# Patient Record
Sex: MALE | Race: WHITE | NOT HISPANIC OR LATINO | Employment: FULL TIME | URBAN - METROPOLITAN AREA
[De-identification: names, ages, dates, MRNs, and addresses within clinical notes are randomized per-mention and may not be internally consistent; named-entity substitution may affect disease eponyms.]

---

## 2017-03-02 ENCOUNTER — OFFICE VISIT (OUTPATIENT)
Dept: FAMILY MEDICINE | Facility: CLINIC | Age: 47
End: 2017-03-02
Payer: COMMERCIAL

## 2017-03-02 VITALS
SYSTOLIC BLOOD PRESSURE: 127 MMHG | TEMPERATURE: 97.3 F | DIASTOLIC BLOOD PRESSURE: 87 MMHG | BODY MASS INDEX: 30.03 KG/M2 | HEART RATE: 97 BPM | HEIGHT: 74 IN | WEIGHT: 234 LBS

## 2017-03-02 DIAGNOSIS — F43.22 ADJUSTMENT DISORDER WITH ANXIOUS MOOD: Primary | ICD-10-CM

## 2017-03-02 DIAGNOSIS — G47.00 INSOMNIA, UNSPECIFIED TYPE: ICD-10-CM

## 2017-03-02 PROCEDURE — 99214 OFFICE O/P EST MOD 30 MIN: CPT | Performed by: FAMILY MEDICINE

## 2017-03-02 RX ORDER — TRAZODONE HYDROCHLORIDE 50 MG/1
50 TABLET, FILM COATED ORAL
Qty: 30 TABLET | Refills: 1 | Status: SHIPPED | OUTPATIENT
Start: 2017-03-02 | End: 2018-09-26

## 2017-03-02 RX ORDER — BUPROPION HYDROCHLORIDE 150 MG/1
TABLET, EXTENDED RELEASE ORAL
Qty: 30 TABLET | Refills: 2 | Status: SHIPPED | OUTPATIENT
Start: 2017-03-02 | End: 2018-09-26

## 2017-03-02 ASSESSMENT — ANXIETY QUESTIONNAIRES
2. NOT BEING ABLE TO STOP OR CONTROL WORRYING: NEARLY EVERY DAY
7. FEELING AFRAID AS IF SOMETHING AWFUL MIGHT HAPPEN: NOT AT ALL
GAD7 TOTAL SCORE: 18
5. BEING SO RESTLESS THAT IT IS HARD TO SIT STILL: NEARLY EVERY DAY
3. WORRYING TOO MUCH ABOUT DIFFERENT THINGS: NEARLY EVERY DAY
6. BECOMING EASILY ANNOYED OR IRRITABLE: NEARLY EVERY DAY
1. FEELING NERVOUS, ANXIOUS, OR ON EDGE: NEARLY EVERY DAY

## 2017-03-02 ASSESSMENT — PATIENT HEALTH QUESTIONNAIRE - PHQ9: 5. POOR APPETITE OR OVEREATING: NEARLY EVERY DAY

## 2017-03-02 NOTE — PROGRESS NOTES
SUBJECTIVE:                                                    Karan Josue is a 46 year old male who presents to clinic today for the following health issues:      Abnormal Mood Symptoms/anxiety     Onset: 1 mo ago     Description:   Depression: no   Anxiety: YES    Accompanying Signs & Symptoms:  Still participating in activities that you used to enjoy: YES  Fatigue: YES  Irritability: YES  Difficulty concentrating: YES  Changes in appetite: no   Problems with sleep: YES  Heart racing/beating fast : no   Thoughts of hurting yourself or others: none     History:   Recent stress: YES- job and trying to find a different job   Prior depression hospitalization: None  Family history of depression: no   Family history of anxiety: no      Precipitating factors:   Alcohol/drug use: no     Alleviating factors:  None        Therapies Tried and outcome: None    Insomnia     Onset: 3 mos     Description:   Time to fall asleep (sleep latency):less than  15 minutes  Middle of night awakening:  YES  Early morning awakening:  YES    Progression of Symptoms:  Worsening in the last mo    Accompanying Signs & Symptoms:  Daytime sleepiness/napping: no   Excessive snoring/apnea: no   Restless legs: no   Frequent urination: no   Chronic pain:  no    History:  Prior Insomnia: YES- 8-9 yrs ago     Precipitating factors:   New stressful situation: YES- loss of a job   Caffeine intake: YES- 6 can daily  OTC decongestants: no   Any new medications: no     Alleviating factors:  Self medicating (alcohol, etc.):  no       Therapies Tried and outcome: none       46 yr old male here for concerns of anxiety. He reports that he was recently laid off and has had more anxiety because of this, patient says that he has struggled with anxiety for a long time but his symptoms have become more pronounced in the last few weeks. He does not know any specific trigger but he tends to have several panic attacks a day. His symptoms usually involve chest  tightness , heart racing , clammy palms.   Patient also reports that sleep has been more difficult. He is able to initiate sleep but he wakes up several times in the middle of the night and is unable to fall back to sleep. He does not report any snoring, he has not had any sleep study. He has tried melatonin which caused him to be irritated on waking up. He also tried Unisom which made him really groggy in the morning. No other medical conditions of note.     Problem list and histories reviewed & adjusted, as indicated.  Additional history: as documented    Patient Active Problem List   Diagnosis     CARDIOVASCULAR SCREENING; LDL GOAL LESS THAN 160     Family history of prostate cancer     Past Surgical History   Procedure Laterality Date     No         Social History   Substance Use Topics     Smoking status: Never Smoker     Smokeless tobacco: Never Used     Alcohol use Yes      Comment: occasional     Family History   Problem Relation Age of Onset     C.A.D. Father 58     nonfatal MI x 2     Prostate Cancer Father 59     Prostate Cancer Maternal Grandfather 69     Prostate Cancer Maternal Uncle 48         Current Outpatient Prescriptions   Medication Sig Dispense Refill     buPROPion (WELLBUTRIN SR) 150 MG 12 hr tablet Take 1 tablet once daily and increase to 1 tablet twice daily after 4 to 7 days 30 tablet 2     traZODone (DESYREL) 50 MG tablet Take 1 tablet (50 mg) by mouth nightly as needed for sleep 30 tablet 1     No Known Allergies  BP Readings from Last 3 Encounters:   03/02/17 127/87   03/06/12 134/74   10/06/10 120/78    Wt Readings from Last 3 Encounters:   03/02/17 234 lb (106.1 kg)   03/06/12 247 lb (112 kg)   10/06/10 245 lb (111.1 kg)                  Labs reviewed in EPIC    Reviewed and updated as needed this visit by clinical staff  Tobacco  Allergies  Med Hx  Surg Hx  Fam Hx  Soc Hx      Reviewed and updated as needed this visit by Provider         ROS:  Constitutional, HEENT,  "cardiovascular, pulmonary, gi and gu systems are negative, except as otherwise noted.    OBJECTIVE:                                                    /87  Pulse 97  Temp 97.3  F (36.3  C) (Tympanic)  Ht 6' 2\" (1.88 m)  Wt 234 lb (106.1 kg)  BMI 30.04 kg/m2  Body mass index is 30.04 kg/(m^2).  GENERAL: healthy, alert and no distress  NECK: no adenopathy, no asymmetry, masses, or scars and thyroid normal to palpation  RESP: lungs clear to auscultation - no rales, rhonchi or wheezes  CV: regular rate and rhythm, normal S1 S2, no S3 or S4, no murmur, click or rub, no peripheral edema and peripheral pulses strong  ABDOMEN: soft, nontender, no hepatosplenomegaly, no masses and bowel sounds normal  MS: no gross musculoskeletal defects noted, no edema    Diagnostic Test Results:  none      ASSESSMENT/PLAN:                                                          ICD-10-CM    1. Adjustment disorder with anxious mood F43.22 buPROPion (WELLBUTRIN SR) 150 MG 12 hr tablet   2. Insomnia, unspecified type G47.00 traZODone (DESYREL) 50 MG tablet   Also recommended counseling and information was given in that regard.     FUTURE APPOINTMENTS:       - Follow-up visit as needed    Ping Taylor MD  [facility]  "

## 2017-03-02 NOTE — PATIENT INSTRUCTIONS
Thank you for choosing [facility].  You may be receiving a survey in the mail from [employer] regarding your visit today.  Please take a few minutes to complete and return the survey to let us know how we are doing.      If you have questions or concerns, please contact us via [employer] or you can contact your care team at [phone].    Our Clinic hours are:  Monday 6:40 am  to 7:00 pm  Tuesday -Friday 6:40 am to 5:00 pm    The [place] outpatient lab hours are:  Monday - Friday 6:10 am to 4:45 pm  Saturdays 7:00 am to 11:00 am  Appointments are required, call [phone]    If you have clinical questions after hours or would like to schedule an appointment,  call the clinic at [phone].  Treating Anxiety Disorders with Therapy  If you have an anxiety disorder, you don t have to suffer anymore. Treatment is available. Therapy (also called counseling) is often a helpful treatment for anxiety disorders. With therapy, a specially trained professional (therapist) helps you face and learn to manage your anxiety. Therapy can be short-term or long-term depending on your needs. In some cases, medication may also be prescribed with therapy. It may take time before you notice how much therapy is helping, but stick with it. With therapy, you can feel better.    Cognitive behavioral therapy (CBT)  Cognitive behavioral therapy (CBT) teaches you to manage anxiety. It does this by helping you understand how you think and act when you re anxious. Research has shown CBT to be a very effective treatment for anxiety disorders. How CBT is run is almost like a class. It involves homework and activities to build skills that teach you to cope with anxiety step by step. It can be done in a group or one-on-one, and often takes place for a set number of sessions. CBT has two main parts:    Cognitive therapy helps you identify the negative, irrational thoughts that occur with your anxiety. You ll learn to replace these with  more positive, realistic thoughts.    Behavioral therapy helps you change how you react to anxiety. You ll learn coping skills and methods for relaxing to help you better deal with anxiety.  Other forms of therapy  Other therapy methods may work better for you than CBT. Or, you may move from CBT to another form of therapy as your treatment needs change. This may mean meeting with a therapist by yourself or in a group. Therapy can also help you work through problems in your life, such as drug or alcohol dependence, that may be making your anxiety worse.  Getting better takes time  Therapy will help you feel better and teach you skills to help manage anxiety long term. But change doesn t happen right away. It takes a commitment from you. And treatment only works if you learn to face the causes of your anxiety. So, you might feel worse before you feel better. This can sometimes make it hard to stick with it. But remember: Therapy is a very effective treatment. The results will be well worth it.  Helping yourself  If anxiety is wearing you down, here are some things you can do to cope:    Don t fight your feelings. Anxiety feeds itself. The more you worry about it, the worse it gets. Instead, try to identify what might have triggered your anxiety. Then try to put this threat in perspective.    Keep in mind that you can t control everything about a situation. Change what you can and let the rest take its course.    Exercise   it s a great way to relieve tension and help your body feel relaxed.    Examine your life for stress, and try to find ways to reduce it.    Avoid caffeine and nicotine, which can make anxiety symptoms worse.    Fight the temptation to turn to alcohol or unprescribed drugs for relief. They only make things worse in the long run.     3617-1971 [employer]. [address] [place] [address], [place] [address]. All rights reserved. This information is not intended as a substitute for professional  medical care. Always follow your healthcare professional's instructions.

## 2017-03-02 NOTE — NURSING NOTE
"Chief Complaint   Patient presents with     Anxiety     Insomnia       Initial /87  Pulse 97  Temp 97.3  F (36.3  C) (Tympanic)  Ht 6' 2\" (1.88 m)  Wt 234 lb (106.1 kg)  BMI 30.04 kg/m2 Estimated body mass index is 30.04 kg/(m^2) as calculated from the following:    Height as of this encounter: 6' 2\" (1.88 m).    Weight as of this encounter: 234 lb (106.1 kg).  Medication Reconciliation: complete  "

## 2017-03-02 NOTE — MR AVS SNAPSHOT
After Visit Summary   3/2/2017    Karan Josue    MRN: [id number]           Patient Information     Date Of Birth          1970        Visit Information        Provider Department      3/2/2017 8:40 AM Ping Taylor MD [facility]        Today's Diagnoses     Adjustment disorder with anxious mood    -  1    Insomnia, unspecified type          Care Instructions          Thank you for choosing [facility].  You may be receiving a survey in the mail from [facility] your [facility] today.  Please take a few minutes to complete and return the survey to let us know how we are doing.      If you have questions or concerns, please contact us via [employer] or you can contact your care team at [phone].    Our Clinic hours are:  Monday 6:40 am  to 7:00 pm  Tuesday -Friday 6:40 am to 5:00 pm    The [place] outpatient lab hours are:  Monday - Friday 6:10 am to 4:45 pm  Saturdays 7:00 am to 11:00 am  Appointments are required, call [phone]    If you have clinical questions after hours or would like to schedule an appointment,  call the clinic at [phone].  Treating Anxiety Disorders with Therapy  If you have an anxiety disorder, you don t have to suffer anymore. Treatment is available. Therapy (also called counseling) is often a helpful treatment for anxiety disorders. With therapy, a specially trained professional (therapist) helps you face and learn to manage your anxiety. Therapy can be short-term or long-term depending on your needs. In some cases, medication may also be prescribed with therapy. It may take time before you notice how much therapy is helping, but stick with it. With therapy, you can feel better.    Cognitive behavioral therapy (CBT)  Cognitive behavioral therapy (CBT) teaches you to manage anxiety. It does this by helping you understand how you think and act when you re anxious. Research has shown CBT to be a very effective treatment for  anxiety disorders. How CBT is run is almost like a class. It involves homework and activities to build skills that teach you to cope with anxiety step by step. It can be done in a group or one-on-one, and often takes place for a set number of sessions. CBT has two main parts:    Cognitive therapy helps you identify the negative, irrational thoughts that occur with your anxiety. You ll learn to replace these with more positive, realistic thoughts.    Behavioral therapy helps you change how you react to anxiety. You ll learn coping skills and methods for relaxing to help you better deal with anxiety.  Other forms of therapy  Other therapy methods may work better for you than CBT. Or, you may move from CBT to another form of therapy as your treatment needs change. This may mean meeting with a therapist by yourself or in a group. Therapy can also help you work through problems in your life, such as drug or alcohol dependence, that may be making your anxiety worse.  Getting better takes time  Therapy will help you feel better and teach you skills to help manage anxiety long term. But change doesn t happen right away. It takes a commitment from you. And treatment only works if you learn to face the causes of your anxiety. So, you might feel worse before you feel better. This can sometimes make it hard to stick with it. But remember: Therapy is a very effective treatment. The results will be well worth it.  Helping yourself  If anxiety is wearing you down, here are some things you can do to cope:    Don t fight your feelings. Anxiety feeds itself. The more you worry about it, the worse it gets. Instead, try to identify what might have triggered your anxiety. Then try to put this threat in perspective.    Keep in mind that you can t control everything about a situation. Change what you can and let the rest take its course.    Exercise -- it s a great way to relieve tension and help your body feel relaxed.    Examine your  "life for stress, and try to find ways to reduce it.    Avoid caffeine and nicotine, which can make anxiety symptoms worse.    Fight the temptation to turn to alcohol or unprescribed drugs for relief. They only make things worse in the long run.     5596-7007 The "[employer]. [address] [place] [address]. All rights reserved. This information is not intended as a substitute for professional medical care. Always follow your healthcare professional's instructions.              Follow-ups after your visit        Who to contact     If you have questions or need follow up information about today's clinic visit or your schedule please contact [facility] directly at [phone].  Normal or non-critical lab and imaging results will be communicated to you by [employer], letter or phone within 4 business days after the clinic has received the results. If you do not hear from us within 7 days, please contact the clinic through [employer] or phone. If you have a critical or abnormal lab result, we will notify you by phone as soon as possible.  Submit refill requests through [employer] or call your pharmacy and they will forward the refill request to us. Please allow 3 business days for your refill to be completed.          Additional Information About Your Visit        [employer] Information     [employer] lets you send messages to your doctor, view your test results, renew your prescriptions, schedule appointments and more. To sign up, go to [email or web address] . Click on \"Log in\" on the left side of the screen, which will take you to the Welcome page. Then click on \"Sign up Now\" on the right side of the page.     You will be asked to enter the access code listed below, as well as some personal information. Please follow the directions to create your username and password.     Your access code is: [email or web address]  Expires: 2017  9:13 AM     Your access code will  in 90 days. If you need help " "or a new code, please call your [facility] or [phone].        Care EveryWhere ID     This is your Care EveryWhere ID. This could be used by other organizations to access your [place] medical records  [id number]        Your Vitals Were     Pulse Temperature Height BMI (Body Mass Index)          97 97.3  F (36.3  C) (Tympanic) 6' 2\" (1.88 m) 30.04 kg/m2         Blood Pressure from Last 3 Encounters:   03/02/17 127/87   03/06/12 134/74   10/06/10 120/78    Weight from Last 3 Encounters:   03/02/17 234 lb (106.1 kg)   03/06/12 247 lb (112 kg)   10/06/10 245 lb (111.1 kg)              Today, you had the following     No orders found for display         Today's Medication Changes          These changes are accurate as of: 3/2/17  9:13 AM.  If you have any questions, ask your nurse or doctor.               Start taking these medicines.        Dose/Directions    buPROPion 150 MG 12 hr tablet   Commonly known as:  WELLBUTRIN SR   Used for:  Adjustment disorder with anxious mood   Started by:  Ping Taylor MD        Take 1 tablet once daily and increase to 1 tablet twice daily after 4 to 7 days   Quantity:  30 tablet   Refills:  2       traZODone 50 MG tablet   Commonly known as:  DESYREL   Used for:  Insomnia, unspecified type   Started by:  Ping Taylor MD        Dose:  50 mg   Take 1 tablet (50 mg) by mouth nightly as needed for sleep   Quantity:  30 tablet   Refills:  1            Where to get your medicines      These medications were sent to [address] [address]     Phone:  [phone]     buPROPion 150 MG 12 hr tablet    traZODone 50 MG tablet                Primary Care Provider    None Specified       No primary provider on file.        Thank you!     Thank you for choosing [facility]  for your care. Our goal is always to provide you with excellent care. Hearing back from our patients is one way we " can continue to improve our services. Please take a few minutes to complete the written survey that you may receive in the mail after your visit with us. Thank you!             Your Updated Medication List - Protect others around you: Learn how to safely use, store and throw away your medicines at www.disposemymeds.org.          This list is accurate as of: 3/2/17  9:13 AM.  Always use your most recent med list.                   Brand Name Dispense Instructions for use    buPROPion 150 MG 12 hr tablet    WELLBUTRIN SR    30 tablet    Take 1 tablet once daily and increase to 1 tablet twice daily after 4 to 7 days       traZODone 50 MG tablet    DESYREL    30 tablet    Take 1 tablet (50 mg) by mouth nightly as needed for sleep

## 2017-03-03 ASSESSMENT — PATIENT HEALTH QUESTIONNAIRE - PHQ9: SUM OF ALL RESPONSES TO PHQ QUESTIONS 1-9: 17

## 2017-03-03 ASSESSMENT — ANXIETY QUESTIONNAIRES: GAD7 TOTAL SCORE: 18

## 2018-09-26 ENCOUNTER — RADIANT APPOINTMENT (OUTPATIENT)
Dept: GENERAL RADIOLOGY | Facility: CLINIC | Age: 48
End: 2018-09-26
Attending: PHYSICIAN ASSISTANT
Payer: COMMERCIAL

## 2018-09-26 ENCOUNTER — OFFICE VISIT (OUTPATIENT)
Dept: FAMILY MEDICINE | Facility: CLINIC | Age: 48
End: 2018-09-26
Payer: COMMERCIAL

## 2018-09-26 VITALS
DIASTOLIC BLOOD PRESSURE: 90 MMHG | HEART RATE: 53 BPM | WEIGHT: 262.7 LBS | HEIGHT: 72 IN | BODY MASS INDEX: 35.58 KG/M2 | SYSTOLIC BLOOD PRESSURE: 150 MMHG

## 2018-09-26 DIAGNOSIS — I10 BENIGN ESSENTIAL HYPERTENSION: ICD-10-CM

## 2018-09-26 DIAGNOSIS — Z12.11 SPECIAL SCREENING FOR MALIGNANT NEOPLASMS, COLON: ICD-10-CM

## 2018-09-26 DIAGNOSIS — E11.65 TYPE 2 DIABETES MELLITUS WITH HYPERGLYCEMIA, WITHOUT LONG-TERM CURRENT USE OF INSULIN (H): Primary | ICD-10-CM

## 2018-09-26 DIAGNOSIS — E66.01 MORBID OBESITY (H): ICD-10-CM

## 2018-09-26 DIAGNOSIS — R06.09 DOE (DYSPNEA ON EXERTION): ICD-10-CM

## 2018-09-26 DIAGNOSIS — Z11.4 SCREENING FOR HUMAN IMMUNODEFICIENCY VIRUS: ICD-10-CM

## 2018-09-26 DIAGNOSIS — Z83.3 FAMILY HISTORY OF DIABETES MELLITUS: ICD-10-CM

## 2018-09-26 DIAGNOSIS — R07.89 CHEST TIGHTNESS: ICD-10-CM

## 2018-09-26 DIAGNOSIS — Z13.220 LIPID SCREENING: ICD-10-CM

## 2018-09-26 DIAGNOSIS — Z80.42 FAMILY HISTORY OF PROSTATE CANCER: ICD-10-CM

## 2018-09-26 DIAGNOSIS — F31.9 BIPOLAR I DISORDER (H): ICD-10-CM

## 2018-09-26 PROBLEM — E11.9 DIABETES MELLITUS, TYPE 2 (H): Status: ACTIVE | Noted: 2018-09-26

## 2018-09-26 LAB
ALBUMIN SERPL-MCNC: 3.8 G/DL (ref 3.4–5)
ALP SERPL-CCNC: 140 U/L (ref 40–150)
ALT SERPL W P-5'-P-CCNC: 38 U/L (ref 0–70)
ANION GAP SERPL CALCULATED.3IONS-SCNC: 7 MMOL/L (ref 3–14)
AST SERPL W P-5'-P-CCNC: 15 U/L (ref 0–45)
BASOPHILS # BLD AUTO: 0.1 10E9/L (ref 0–0.2)
BASOPHILS NFR BLD AUTO: 1 %
BILIRUB SERPL-MCNC: 0.6 MG/DL (ref 0.2–1.3)
BUN SERPL-MCNC: 13 MG/DL (ref 7–30)
CALCIUM SERPL-MCNC: 9 MG/DL (ref 8.5–10.1)
CHLORIDE SERPL-SCNC: 101 MMOL/L (ref 94–109)
CHOLEST SERPL-MCNC: 213 MG/DL
CO2 SERPL-SCNC: 27 MMOL/L (ref 20–32)
CREAT SERPL-MCNC: 0.8 MG/DL (ref 0.66–1.25)
DIFFERENTIAL METHOD BLD: ABNORMAL
EOSINOPHIL # BLD AUTO: 0.2 10E9/L (ref 0–0.7)
EOSINOPHIL NFR BLD AUTO: 2.7 %
ERYTHROCYTE [DISTWIDTH] IN BLOOD BY AUTOMATED COUNT: 13.2 % (ref 10–15)
GFR SERPL CREATININE-BSD FRML MDRD: >90 ML/MIN/1.7M2
GLUCOSE SERPL-MCNC: 246 MG/DL (ref 70–99)
HBA1C MFR BLD: 10.8 % (ref 0–5.6)
HCT VFR BLD AUTO: 45.6 % (ref 40–53)
HDLC SERPL-MCNC: 34 MG/DL
HGB BLD-MCNC: 14.8 G/DL (ref 13.3–17.7)
HIV 1+2 AB+HIV1 P24 AG SERPL QL IA: NONREACTIVE
IMM GRANULOCYTES # BLD: 0.1 10E9/L (ref 0–0.4)
IMM GRANULOCYTES NFR BLD: 1.2 %
LDLC SERPL CALC-MCNC: 158 MG/DL
LYMPHOCYTES # BLD AUTO: 2.4 10E9/L (ref 0.8–5.3)
LYMPHOCYTES NFR BLD AUTO: 27.5 %
MCH RBC QN AUTO: 26.2 PG (ref 26.5–33)
MCHC RBC AUTO-ENTMCNC: 32.5 G/DL (ref 31.5–36.5)
MCV RBC AUTO: 81 FL (ref 78–100)
MONOCYTES # BLD AUTO: 0.8 10E9/L (ref 0–1.3)
MONOCYTES NFR BLD AUTO: 9 %
NEUTROPHILS # BLD AUTO: 5.2 10E9/L (ref 1.6–8.3)
NEUTROPHILS NFR BLD AUTO: 58.6 %
NONHDLC SERPL-MCNC: 179 MG/DL
NRBC # BLD AUTO: 0 10*3/UL
NRBC BLD AUTO-RTO: 0 /100
PLATELET # BLD AUTO: 376 10E9/L (ref 150–450)
POTASSIUM SERPL-SCNC: 4.1 MMOL/L (ref 3.4–5.3)
PROT SERPL-MCNC: 7.8 G/DL (ref 6.8–8.8)
PSA SERPL-ACNC: 1.92 UG/L (ref 0–4)
RBC # BLD AUTO: 5.64 10E12/L (ref 4.4–5.9)
SODIUM SERPL-SCNC: 135 MMOL/L (ref 133–144)
TRIGL SERPL-MCNC: 106 MG/DL
TSH SERPL DL<=0.005 MIU/L-ACNC: 1.43 MU/L (ref 0.4–4)
WBC # BLD AUTO: 8.9 10E9/L (ref 4–11)

## 2018-09-26 PROCEDURE — 80061 LIPID PANEL: CPT | Performed by: PHYSICIAN ASSISTANT

## 2018-09-26 PROCEDURE — G0103 PSA SCREENING: HCPCS | Performed by: PHYSICIAN ASSISTANT

## 2018-09-26 PROCEDURE — 71046 X-RAY EXAM CHEST 2 VIEWS: CPT | Mod: FY

## 2018-09-26 PROCEDURE — 84443 ASSAY THYROID STIM HORMONE: CPT | Performed by: PHYSICIAN ASSISTANT

## 2018-09-26 PROCEDURE — 36415 COLL VENOUS BLD VENIPUNCTURE: CPT | Performed by: PHYSICIAN ASSISTANT

## 2018-09-26 PROCEDURE — 83036 HEMOGLOBIN GLYCOSYLATED A1C: CPT | Performed by: PHYSICIAN ASSISTANT

## 2018-09-26 PROCEDURE — 87389 HIV-1 AG W/HIV-1&-2 AB AG IA: CPT | Performed by: PHYSICIAN ASSISTANT

## 2018-09-26 PROCEDURE — 80053 COMPREHEN METABOLIC PANEL: CPT | Performed by: PHYSICIAN ASSISTANT

## 2018-09-26 PROCEDURE — 93000 ELECTROCARDIOGRAM COMPLETE: CPT | Performed by: PHYSICIAN ASSISTANT

## 2018-09-26 PROCEDURE — 85025 COMPLETE CBC W/AUTO DIFF WBC: CPT | Performed by: PHYSICIAN ASSISTANT

## 2018-09-26 PROCEDURE — 99214 OFFICE O/P EST MOD 30 MIN: CPT | Performed by: PHYSICIAN ASSISTANT

## 2018-09-26 RX ORDER — METFORMIN HCL 500 MG
500 TABLET, EXTENDED RELEASE 24 HR ORAL
Qty: 120 TABLET | Refills: 1 | Status: SHIPPED | OUTPATIENT
Start: 2018-09-26 | End: 2018-12-24

## 2018-09-26 RX ORDER — LISINOPRIL 20 MG/1
20 TABLET ORAL DAILY
Qty: 30 TABLET | Refills: 1 | Status: SHIPPED | OUTPATIENT
Start: 2018-09-26 | End: 2018-12-21 | Stop reason: DRUGHIGH

## 2018-09-26 ASSESSMENT — PATIENT HEALTH QUESTIONNAIRE - PHQ9: 5. POOR APPETITE OR OVEREATING: NEARLY EVERY DAY

## 2018-09-26 ASSESSMENT — ANXIETY QUESTIONNAIRES
GAD7 TOTAL SCORE: 7
6. BECOMING EASILY ANNOYED OR IRRITABLE: SEVERAL DAYS
2. NOT BEING ABLE TO STOP OR CONTROL WORRYING: NOT AT ALL
3. WORRYING TOO MUCH ABOUT DIFFERENT THINGS: NOT AT ALL
5. BEING SO RESTLESS THAT IT IS HARD TO SIT STILL: NEARLY EVERY DAY
1. FEELING NERVOUS, ANXIOUS, OR ON EDGE: NOT AT ALL
7. FEELING AFRAID AS IF SOMETHING AWFUL MIGHT HAPPEN: NOT AT ALL

## 2018-09-26 NOTE — MR AVS SNAPSHOT
After Visit Summary   9/26/2018    Karan Josue    MRN: [id number]           Patient Information     Date Of Birth          1970        Visit Information        Provider Department      9/26/2018 8:20 AM Jimena Grider PA-C [facility] [place]        Today's Diagnoses     Family history of diabetes mellitus    -  1    Lipid screening        Special screening for malignant neoplasms, colon        Screening for human immunodeficiency virus        SALCEDO (dyspnea on exertion)        Type 2 diabetes mellitus with hyperglycemia, without long-term current use of insulin (H)        Family history of prostate cancer        Chest tightness        Morbid obesity (H)        Bipolar I disorder (H)        Benign essential hypertension          Care Instructions    Diabetes: start metformin. This may cause diarrhea. Let me know how you do with this  Make appointment to talk to diabetic educator  Start checking blood sugars 3 times daily, write them down    Hypertension: start lisinopril 20 mg to protect your kidneys and treat hypertension    Follow-up in 3-4 weeks for a recheck          Follow-ups after your visit        Additional Services     DIABETES EDUCATOR REFERRAL       DIABETES SELF MANAGEMENT TRAINING (DSMT)      Your provider has referred you to Diabetes Education: [facility]: Diabetes Education - All [facility] [phone]   [email or web address]     If an urgent visit is needed or A1C is above 12, Care Team to call the Diabetes  Education Team at [phone] or send an In Basket message to the Diabetes Education Pool (P DIAB ED-PATIENT CARE).    A  will call you to make your appointment. If it has been more than 3 business days since your referral was placed, please call the above phone number to schedule.    Type of training and number of hours: New Diagnosis: Initial group DSMT - 10 hours.      Diabetes Type: Type 2 - On Oral Medication    Medicare covers: 10 hours of initial DSMT in 12 month period from the time of first visit, plus 2 hours of follow-up DSMT annually, and additional hours as requested for insulin training.         Diabetes Co-Morbidities: hypertension               A1C Goal:  <7.0       A1C is: Lab Results       Component                Value               Date                       A1C                      10.8                09/26/2018              MEDICAL NUTRITION THERAPY (MNT) for Diabetes    Medical Nutrition Therapy with a Registered Dietitian can be provided in coordination with Diabetes Self-Management Training to assist in achieving optimal diabetes management.    MNT Type and Hours: New diagnosis: Initial MNT - 3 hours                       Medicare will cover: 3 hours initial MNT in 12 month period after first visit, plus 2 hours of follow-up MNT annually        Diabetes Education Topics: Comprehensive Knowledge Assessment and Instruction    Special Educational Needs Requiring Individual DSMT: None      Please be aware that coverage of these services is subject to the terms and limitations of your health insurance plan.  Call member services at your health plan to determine Diabetes Self-Management Training (Codes  and ) and Medical Nutrition Therapy (Codes 70491 and 90621) benefits and ask which blood glucose monitor brands are covered by your plan.  Please bring the following with you to your appointment:    (1)  List of current medications   (2)  List of Blood Glucose Monitor brands that are covered by your insurance plan  (3)  Blood Glucose Monitor and log book  (4)   Food records for the 3 days prior to your visit    The Certified Diabetes Educator may make diabetes medication adjustments per the CDE Protocol and Collaborative Practice Agreement.                  Follow-up notes from your care team     Return in about 3 weeks (around 10/17/2018).      Future tests that were ordered for you today     Open  "Future Orders        Priority Expected Expires Ordered    Exercise Stress Echocardiogram Routine  9/26/2019 9/26/2018            Who to contact     Normal or non-critical lab and imaging results will be communicated to you by MyChart, letter or phone within 4 business days after the clinic has received the results. If you do not hear from us within 7 days, please contact the clinic through MyChart or phone. If you have a critical or abnormal lab result, we will notify you by phone as soon as possible.  Submit refill requests through [employer] or call your pharmacy and they will forward the refill request to us. Please allow 3 business days for your refill to be completed.          If you need to speak with a  for additional information , please call: [phone]             Additional Information About Your Visit        Care EveryWhere ID     This is your Care EveryWhere ID. This could be used by other organizations to access your [facility] medical records  [id number]        Your Vitals Were     Pulse Height BMI (Body Mass Index)             53 6' 0.24\" (1.835 m) 35.39 kg/m2          Blood Pressure from Last 3 Encounters:   09/26/18 150/90   03/02/17 127/87   03/06/12 134/74    Weight from Last 3 Encounters:   09/26/18 262 lb 11.2 oz (119.2 kg)   03/02/17 234 lb (106.1 kg)   03/06/12 247 lb (112 kg)              We Performed the Following     CBC with platelets differential     Comprehensive metabolic panel     DEPRESSION ACTION PLAN (DAP)     DIABETES EDUCATOR REFERRAL     EKG 12-lead complete w/read - Clinics     Hemoglobin A1c     HIV Antigen Antibody Combo     Lipid panel reflex to direct LDL Fasting     PSA, screen     TSH with free T4 reflex          Today's Medication Changes          These changes are accurate as of 9/26/18  9:25 AM.  If you have any questions, ask your nurse or doctor.               Start taking these medicines.        Dose/Directions    blood glucose lancets standard "   Commonly known as:  no brand specified   Used for:  Type 2 diabetes mellitus with hyperglycemia, without long-term current use of insulin (H)   Started by:  Jimena Grider PA-C        Use to test blood sugar 3 times daily or as directed.   Quantity:  100 each   Refills:  11       blood glucose monitoring meter device kit   Commonly known as:  no brand specified   Used for:  Type 2 diabetes mellitus with hyperglycemia, without long-term current use of insulin (H)   Started by:  Jimena Grider PA-C        Use to test blood sugar 3 times daily or as directed.   Quantity:  1 kit   Refills:  0       blood glucose monitoring test strip   Commonly known as:  no brand specified   Used for:  Type 2 diabetes mellitus with hyperglycemia, without long-term current use of insulin (H)   Started by:  Jimena Grider PA-C        Use to test blood sugars 3 times daily or as directed   Quantity:  100 strip   Refills:  1       lisinopril 20 MG tablet   Commonly known as:  PRINIVIL/ZESTRIL   Used for:  Benign essential hypertension   Started by:  Jimena Grider PA-C        Dose:  20 mg   Take 1 tablet (20 mg) by mouth daily   Quantity:  30 tablet   Refills:  1       metFORMIN 500 MG 24 hr tablet   Commonly known as:  GLUCOPHAGE-XR   Used for:  Type 2 diabetes mellitus with hyperglycemia, without long-term current use of insulin (H)   Started by:  Jimena Grider PA-C        Dose:  500 mg   Take 1 tablet (500 mg) by mouth daily (with dinner) For a few days. Then increase to 500 mg two times daily x1 week, then increase to 1000 mg (2 tablets) two times daily   Quantity:  120 tablet   Refills:  1            Where to get your medicines      These medications were sent to [facility] [place]  [place], [place] - [address]  [address] [place] [place] [address]     Phone:  [phone]     blood glucose lancets standard    blood glucose monitoring meter device kit    blood glucose monitoring test strip     lisinopril 20 MG tablet         Some of these will need a paper prescription and others can be bought over the counter.  Ask your nurse if you have questions.     Bring a paper prescription for each of these medications     metFORMIN 500 MG 24 hr tablet                Primary Care Provider Office Phone # Fax #    [facility] [phone] [address] [facility] [address]        Equal Access to Services     SHERICE ANDERSON : Hadii aad ku hadasho Soomaali, waaxda luqadaha, qaybta kaalmada adeegyada, waxay idiin hayaan adeeg khdaniash la'aan . So [facility] [phone].    ATENCIÓN: Si habla español, tiene a cortez disposición servicios gratuitos de asistencia lingüística. Llame al [phone].    We comply with applicable federal civil rights laws and [place] laws. We do not discriminate on the basis of race, color, national origin, age, disability, sex, sexual orientation, or gender identity.            Thank you!     Thank you for choosing [facility]  for your care. Our goal is always to provide you with excellent care. Hearing back from our patients is one way we can continue to improve our services. Please take a few minutes to complete the written survey that you may receive in the mail after your visit with us. Thank you!             Your Updated Medication List - Protect others around you: Learn how to safely use, store and throw away your medicines at www.disposemymeds.org.          This list is accurate as of 9/26/18  9:25 AM.  Always use your most recent med list.                   Brand Name Dispense Instructions for use Diagnosis    blood glucose lancets standard    no brand specified    100 each    Use to test blood sugar 3 times daily or as directed.    Type 2 diabetes mellitus with hyperglycemia, without long-term current use of insulin (H)       blood glucose monitoring meter device kit    no brand specified    1 kit    Use to test blood sugar 3 times daily or as  directed.    Type 2 diabetes mellitus with hyperglycemia, without long-term current use of insulin (H)       blood glucose monitoring test strip    no brand specified    100 strip    Use to test blood sugars 3 times daily or as directed    Type 2 diabetes mellitus with hyperglycemia, without long-term current use of insulin (H)       lisinopril 20 MG tablet    PRINIVIL/ZESTRIL    30 tablet    Take 1 tablet (20 mg) by mouth daily    Benign essential hypertension       metFORMIN 500 MG 24 hr tablet    GLUCOPHAGE-XR    120 tablet    Take 1 tablet (500 mg) by mouth daily (with dinner) For a few days. Then increase to 500 mg two times daily x1 week, then increase to 1000 mg (2 tablets) two times daily    Type 2 diabetes mellitus with hyperglycemia, without long-term current use of insulin (H)

## 2018-09-26 NOTE — LETTER
My Depression Action Plan  Name: Karan Josue   Date of Birth 1970  Date: 9/26/2018    My doctor: Anya Grimaldo   My clinic: [facility]  [address] [facility] [address] [phone]          GREEN    ZONE   Good Control    What it looks like:     Things are going generally well. You have normal up s and down s. You may even feel depressed from time to time, but bad moods usually last less than a day.   What you need to do:  1. Continue to care for yourself (see self care plan)  2. Check your depression survival kit and update it as needed  3. Follow your physician s recommendations including any medication.  4. Do not stop taking medication unless you consult with your physician first.           YELLOW         ZONE Getting Worse    What it looks like:     Depression is starting to interfere with your life.     It may be hard to get out of bed; you may be starting to isolate yourself from others.    Symptoms of depression are starting to last most all day and this has happened for several days.     You may have suicidal thoughts but they are not constant.   What you need to do:     1. Call your care team, your response to treatment will improve if you keep your care team informed of your progress. Yellow periods are signs an adjustment may need to be made.     2. Continue your self-care, even if you have to fake it!    3. Talk to someone in your support network    4. Open up your depression survival kit           RED    ZONE Medical Alert - Get Help    What it looks like:     Depression is seriously interfering with your life.     You may experience these or other symptoms: You can t get out of bed most days, can t work or engage in other necessary activities, you have trouble taking care of basic hygiene, or basic responsibilities, thoughts of suicide or death that will not go away, self-injurious behavior.     What you need to do:  1. Call your care team and request  a same-day appointment. If they are not available (weekends or after hours) call your local crisis line, emergency room or 911.            Depression Self Care Plan / Survival Kit    Self-Care for Depression  Here s the deal. Your body and mind are really not as separate as most people think.  What you do and think affects how you feel and how you feel influences what you do and think. This means if you do things that people who feel good do, it will help you feel better.  Sometimes this is all it takes.  There is also a place for medication and therapy depending on how severe your depression is, so be sure to consult with your medical provider and/ or Behavioral Health Consultant if your symptoms are worsening or not improving.     In order to better manage my stress, I will:    Exercise  Get some form of exercise, every day. This will help reduce pain and release endorphins, the  feel good  chemicals in your brain. This is almost as good as taking antidepressants!  This is not the same as joining a gym and then never going! (they count on that by the way ) It can be as simple as just going for a walk or doing some gardening, anything that will get you moving.      Hygiene   Maintain good hygiene (Get out of bed in the morning, Make your bed, Brush your teeth, Take a shower, and Get dressed like you were going to work, even if you are unemployed).  If your clothes don't fit try to get ones that do.    Diet  I will strive to eat foods that are good for me, drink plenty of water, and avoid excessive sugar, caffeine, alcohol, and other mood-altering substances.  Some foods that are helpful in depression are: complex carbohydrates, B vitamins, flaxseed, fish or fish oil, fresh fruits and vegetables.    Psychotherapy  I agree to participate in Individual Therapy (if recommended).    Medication  If prescribed medications, I agree to take them.  Missing doses can result in serious side effects.  I understand that drinking  alcohol, or other illicit drug use, may cause potential side effects.  I will not stop my medication abruptly without first discussing it with my provider.    Staying Connected With Others  I will stay in touch with my friends, family members, and my primary care provider/team.    Use your imagination  Be creative.  We all have a creative side; it doesn t matter if it s oil painting, sand castles, or mud pies! This will also kick up the endorphins.    Witness Beauty  (AKA stop and smell the roses) Take a look outside, even in mid-winter. Notice colors, textures. Watch the squirrels and birds.     Service to others  Be of service to others.  There is always someone else in need.  By helping others we can  get out of ourselves  and remember the really important things.  This also provides opportunities for practicing all the other parts of the program.    Humor  Laugh and be silly!  Adjust your TV habits for less news and crime-drama and more comedy.    Control your stress  Try breathing deep, massage therapy, biofeedback, and meditation. Find time to relax each day.     My support system    Clinic Contact:  Phone number:    Contact 1:  Phone number:    Contact 2:  Phone number:    Catholic/:  Phone number:    Therapist:  Phone number:    Local crisis center:    Phone number:    Other community support:  Phone number:

## 2018-09-26 NOTE — PATIENT INSTRUCTIONS
Diabetes: start metformin. This may cause diarrhea. Let me know how you do with this  Make appointment to talk to diabetic educator  Start checking blood sugars 3 times daily, write them down    Hypertension: start lisinopril 20 mg to protect your kidneys and treat hypertension    Follow-up in 3-4 weeks for a recheck   Subjective:       Patient ID: Jaron Stein is a 41 y.o. male.    Vitals:  height is 6' (1.829 m) and weight is 115.7 kg (255 lb). His temperature is 98.4 °F (36.9 °C). His blood pressure is 105/65 and his pulse is 78. His respiration is 18 and oxygen saturation is 98%.     Chief Complaint: URI    URI    This is a new problem. Episode onset: 2 weeks. The problem has been gradually worsening. There has been no fever. Associated symptoms include congestion, coughing, headaches, a plugged ear sensation, rhinorrhea, sinus pain and sneezing. Pertinent negatives include no abdominal pain, chest pain, ear pain, nausea, sore throat or wheezing. He has tried acetaminophen and decongestant (nyquil) for the symptoms. The treatment provided mild relief.     Review of Systems   Constitution: Negative for chills, fever and malaise/fatigue.   HENT: Positive for congestion, hoarse voice, rhinorrhea, sinus pain and sneezing. Negative for ear pain and sore throat.    Eyes: Positive for discharge and redness.   Cardiovascular: Negative for chest pain, dyspnea on exertion and leg swelling.   Respiratory: Positive for cough and sputum production. Negative for shortness of breath and wheezing.    Musculoskeletal: Negative for myalgias.   Gastrointestinal: Negative for abdominal pain and nausea.   Neurological: Positive for headaches.   All other systems reviewed and are negative.      Objective:      Physical Exam   Constitutional: He is oriented to person, place, and time. Vital signs are normal. He appears well-developed and well-nourished. He is cooperative.  Non-toxic appearance. He does not have a sickly appearance. He does not appear ill. No distress.   HENT:   Head: Normocephalic and atraumatic.   Right Ear: Hearing, external ear and ear canal normal. A middle ear effusion is present.   Left Ear: Hearing, external ear and ear canal normal. A middle ear effusion is present.   Nose: Mucosal edema and rhinorrhea present. Right sinus  exhibits maxillary sinus tenderness and frontal sinus tenderness. Left sinus exhibits maxillary sinus tenderness and frontal sinus tenderness.   Mouth/Throat: Uvula is midline and mucous membranes are normal. Posterior oropharyngeal edema and posterior oropharyngeal erythema present.   Eyes: Conjunctivae and lids are normal.   Neck: Normal range of motion and full passive range of motion without pain. Neck supple. No neck rigidity. No edema, no erythema and normal range of motion present.   Cardiovascular: Normal rate, regular rhythm and normal heart sounds.    Pulmonary/Chest: Effort normal and breath sounds normal. No accessory muscle usage. No apnea, no tachypnea and no bradypnea. No respiratory distress. He has no decreased breath sounds. He has no wheezes. He has no rhonchi. He has no rales.   Positive cough   Abdominal:   Obese abdomen   Lymphadenopathy:        Head (right side): No submental, no submandibular, no tonsillar, no preauricular, no posterior auricular and no occipital adenopathy present.        Head (left side): No submental, no submandibular, no tonsillar, no preauricular, no posterior auricular and no occipital adenopathy present.     He has no cervical adenopathy.   Neurological: He is alert and oriented to person, place, and time.   Skin: Skin is warm. Capillary refill takes less than 2 seconds.   Psychiatric: He has a normal mood and affect. His speech is normal and behavior is normal.   Nursing note and vitals reviewed.      Assessment:       1. Acute non-recurrent pansinusitis    2. Bacterial conjunctivitis        Plan:         Acute non-recurrent pansinusitis  -     amoxicillin-clavulanate 875-125mg (AUGMENTIN) 875-125 mg per tablet; Take 1 tablet by mouth 2 (two) times daily.  Dispense: 20 tablet; Refill: 0  -     benzonatate (TESSALON PERLES) 100 MG capsule; Take 2 capsules (200 mg total) by mouth 3 (three) times daily as needed.  Dispense: 30 capsule; Refill: 0  -     fluticasone  (FLONASE) 50 mcg/actuation nasal spray; 2 sprays (100 mcg total) by Each Nare route once daily.  Dispense: 1 Bottle; Refill: 0    Bacterial conjunctivitis  -     ciprofloxacin HCl (CILOXAN) 0.3 % ophthalmic solution; Place 1 drop into both eyes every 4 (four) hours.  Dispense: 10 mL; Refill: 0      Mucinex and increase fluid intake.
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
## 2018-09-27 ASSESSMENT — ANXIETY QUESTIONNAIRES: GAD7 TOTAL SCORE: 7

## 2018-09-27 ASSESSMENT — PATIENT HEALTH QUESTIONNAIRE - PHQ9: SUM OF ALL RESPONSES TO PHQ QUESTIONS 1-9: 3

## 2018-10-04 ENCOUNTER — TELEPHONE (OUTPATIENT)
Dept: FAMILY MEDICINE | Facility: CLINIC | Age: 48
End: 2018-10-04

## 2018-10-04 ENCOUNTER — HOSPITAL ENCOUNTER (OUTPATIENT)
Dept: CARDIOLOGY | Facility: CLINIC | Age: 48
Discharge: HOME | End: 2018-10-04
Attending: PHYSICIAN ASSISTANT | Admitting: PHYSICIAN ASSISTANT
Payer: COMMERCIAL

## 2018-10-04 DIAGNOSIS — R94.39 ABNORMAL CARDIOVASCULAR STRESS TEST: Primary | ICD-10-CM

## 2018-10-04 DIAGNOSIS — R07.9 CHEST PAIN, UNSPECIFIED TYPE: ICD-10-CM

## 2018-10-04 DIAGNOSIS — R06.09 DOE (DYSPNEA ON EXERTION): ICD-10-CM

## 2018-10-04 DIAGNOSIS — R07.89 CHEST TIGHTNESS: ICD-10-CM

## 2018-10-04 PROCEDURE — 93018 CV STRESS TEST I&R ONLY: CPT | Performed by: INTERNAL MEDICINE

## 2018-10-04 PROCEDURE — 93321 DOPPLER ECHO F-UP/LMTD STD: CPT | Mod: 26 | Performed by: INTERNAL MEDICINE

## 2018-10-04 PROCEDURE — 93325 DOPPLER ECHO COLOR FLOW MAPG: CPT | Mod: TC

## 2018-10-04 PROCEDURE — 93325 DOPPLER ECHO COLOR FLOW MAPG: CPT | Mod: 26 | Performed by: INTERNAL MEDICINE

## 2018-10-04 PROCEDURE — 93016 CV STRESS TEST SUPVJ ONLY: CPT

## 2018-10-04 PROCEDURE — 93350 STRESS TTE ONLY: CPT | Mod: 26 | Performed by: INTERNAL MEDICINE

## 2018-10-04 PROCEDURE — 25500064 ZZH RX 255 OP 636: Performed by: FAMILY MEDICINE

## 2018-10-04 RX ADMIN — HUMAN ALBUMIN MICROSPHERES AND PERFLUTREN 2 ML: 10; .22 INJECTION, SOLUTION INTRAVENOUS at 10:25

## 2018-10-04 NOTE — TELEPHONE ENCOUNTER
Call placed to patient. Reported note per ROMARIO Grider.  Scheduled Cardiology appointment with Dr Christiansen at [facility] [place]. 10/9/18 at 11:30 am.  Patient verbalizes understanding. Advised to seek immediate ED eval if chest pain or shortness of air.   Aarti BENAVIDEZ/CHRISTA

## 2018-10-04 NOTE — TELEPHONE ENCOUNTER
Please call cardiology to set patient up with an appointment asap. He may need to go to [facility]  Please tell patient that his stress test showed normal EKG however he had chest pain with exercise and they saw a small area of hypokinesis meaning the heart was not moving as well and is consistent with ischemia. The cardiologist reading the stress test recommended cardiology consult given chest pain and findings.  Jimena Grider PA-C

## 2018-10-04 NOTE — PROGRESS NOTES
Pt is here for a exercise stress echo for c/o increasing dyspnea and random episodes for chest pain not associated with exercise. During the stress test the pt experienced 6/10 chest pain and moderate/severe dyspnea at peak exercise. The test was stopped. No EKG changes noted. The pt had resolution of symptoms at 6 minutes recovery.     The test was discuss with supervising provider, Dr Caballero. Pt is okayed to go home. Test will be read today and pt will be notified of results.

## 2018-10-04 NOTE — TELEPHONE ENCOUNTER
Call placed to patient to inform of orders to avoid vigorous exercise. Patient agrees and verbalizes understanding.  Normal daily activities are ok, nothing strenuous.  Aarti BENAVIDEZ/CHRISTA

## 2018-10-05 ASSESSMENT — MIFFLIN-ST. JEOR: SCORE: 2107.71

## 2018-10-06 ENCOUNTER — SURGERY - HEALTHEAST (OUTPATIENT)
Dept: CARDIOLOGY | Facility: CLINIC | Age: 48
End: 2018-10-06

## 2018-10-11 ENCOUNTER — OFFICE VISIT (OUTPATIENT)
Dept: FAMILY MEDICINE | Facility: CLINIC | Age: 48
End: 2018-10-11
Payer: COMMERCIAL

## 2018-10-11 VITALS
SYSTOLIC BLOOD PRESSURE: 142 MMHG | BODY MASS INDEX: 35.21 KG/M2 | TEMPERATURE: 97.6 F | OXYGEN SATURATION: 95 % | WEIGHT: 260 LBS | HEART RATE: 90 BPM | HEIGHT: 72 IN | DIASTOLIC BLOOD PRESSURE: 90 MMHG

## 2018-10-11 DIAGNOSIS — R06.02 SOB (SHORTNESS OF BREATH): ICD-10-CM

## 2018-10-11 DIAGNOSIS — I10 BENIGN ESSENTIAL HYPERTENSION: ICD-10-CM

## 2018-10-11 DIAGNOSIS — S30.861A TICK BITE OF ABDOMINAL WALL, INITIAL ENCOUNTER: ICD-10-CM

## 2018-10-11 DIAGNOSIS — I20.0 UNSTABLE ANGINA (H): ICD-10-CM

## 2018-10-11 DIAGNOSIS — E11.65 TYPE 2 DIABETES MELLITUS WITH HYPERGLYCEMIA, WITHOUT LONG-TERM CURRENT USE OF INSULIN (H): Primary | ICD-10-CM
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
## 2018-10-11 LAB
ANION GAP SERPL CALCULATED.3IONS-SCNC: 6 MMOL/L (ref 3–14)
BUN SERPL-MCNC: 13 MG/DL (ref 7–30)
CALCIUM SERPL-MCNC: 9 MG/DL (ref 8.5–10.1)
CHLORIDE SERPL-SCNC: 103 MMOL/L (ref 94–109)
CO2 SERPL-SCNC: 26 MMOL/L (ref 20–32)
CREAT SERPL-MCNC: 0.87 MG/DL (ref 0.66–1.25)
GFR SERPL CREATININE-BSD FRML MDRD: >90 ML/MIN/1.7M2
GLUCOSE SERPL-MCNC: 236 MG/DL (ref 70–99)
POTASSIUM SERPL-SCNC: 4 MMOL/L (ref 3.4–5.3)
SODIUM SERPL-SCNC: 135 MMOL/L (ref 133–144)

## 2018-10-11 PROCEDURE — 99214 OFFICE O/P EST MOD 30 MIN: CPT | Performed by: PHYSICIAN ASSISTANT

## 2018-10-11 PROCEDURE — 36415 COLL VENOUS BLD VENIPUNCTURE: CPT | Performed by: PHYSICIAN ASSISTANT

## 2018-10-11 PROCEDURE — 80048 BASIC METABOLIC PNL TOTAL CA: CPT | Performed by: PHYSICIAN ASSISTANT

## 2018-10-11 RX ORDER — FAMOTIDINE 20 MG/1
20 TABLET, FILM COATED ORAL
COMMUNITY
Start: 2018-10-06 | End: 2020-06-29

## 2018-10-11 RX ORDER — DOXYCYCLINE 100 MG/1
100 CAPSULE ORAL 2 TIMES DAILY
Qty: 42 CAPSULE | Refills: 0 | Status: SHIPPED | OUTPATIENT
Start: 2018-10-11 | End: 2019-02-18

## 2018-10-11 RX ORDER — ATORVASTATIN CALCIUM 40 MG/1
40 TABLET, FILM COATED ORAL DAILY
Qty: 30 TABLET | Refills: 1 | Status: SHIPPED | OUTPATIENT
Start: 2018-10-11 | End: 2020-06-29

## 2018-10-11 RX ORDER — HYDROCHLOROTHIAZIDE 12.5 MG/1
12.5 TABLET ORAL DAILY
Qty: 30 TABLET | Refills: 1 | Status: SHIPPED | OUTPATIENT
Start: 2018-10-11 | End: 2018-12-21 | Stop reason: DRUGHIGH

## 2018-10-11 NOTE — MR AVS SNAPSHOT
After Visit Summary   10/11/2018    Karan Josue    MRN: [id number]           Patient Information     Date Of Birth          1970        Visit Information        Provider Department      10/11/2018 2:20 PM Jimena Grider PA-C [facility]        Today's Diagnoses     Type 2 diabetes mellitus with hyperglycemia, without long-term current use of insulin (H)    -  1    Unstable angina (H)        Benign essential hypertension        Tick bite of abdominal wall, initial encounter          Care Instructions    Follow up in 3-4 weeks, sooner if needed    Start doxycycline for 3 weeks to treat lyme disease    Continue lisinopril 20 mg  Start hydrochlorothiazide 12.5 mg for blood pressure     Start lipitor for heart protection    Do start the pepcid to treat heartburn  We will see if this helps symptoms     Pulmonary function testing - [phone]           Follow-ups after your visit        Additional Services     CARDIO  ADULT REFERRAL       [facility] is referring you to Cardiology Services.       The  Representative will assist you in the coordination of your Cardiology care as prescribed by your physician.    The  Representative will call you within 24 hours to help schedule your appointment, or you may contact the  Representative at: [phone].         Type of Referral: Cardiology Follow Up            Timeframe requested: within 4 weeks       Coverage of these services is subject to the terms and limitations of your health insurance plan.  Please call member services at your health plan with any benefit or coverage questions.      If X-rays, CT or MRI's have been performed, please contact the facility where they were done to arrange for , prior to your scheduled appointment.  Please bring this referral request to your appointment and present it to your specialist.                  Follow-up notes from your care team      "Return in about 4 weeks (around 11/8/2018).      Your next 10 appointments already scheduled     Oct 24, 2018 12:00 PM CDT   Diabetes Education with [place] [facility]   [facility] ([facility])    [address] [id number] [phone]              Who to contact     Normal or non-critical lab and imaging results will be communicated to you by MyChart, letter or phone within 4 business days after the clinic has received the results. If you do not hear from us within 7 days, please contact the clinic through MyChart or phone. If you have a critical or abnormal lab result, we will notify you by phone as soon as possible.  Submit refill requests through [place] or call your pharmacy and they will forward the refill request to us. Please allow 3 business days for your refill to be completed.          If you need to speak with a  for additional information , please call: [phone]             Additional Information About Your Visit        Care EveryWhere ID     This is your Care EveryWhere ID. This could be used by other organizations to access your [place] medical records  [id number]        Your Vitals Were     Pulse Temperature Height Pulse Oximetry BMI (Body Mass Index)       90 97.6  F (36.4  C) (Tympanic) 6' 0.24\" (1.835 m) 95% 35.02 kg/m2        Blood Pressure from Last 3 Encounters:   10/11/18 142/90   09/26/18 150/90   03/02/17 127/87    Weight from Last 3 Encounters:   10/11/18 260 lb (117.9 kg)   09/26/18 262 lb 11.2 oz (119.2 kg)   03/02/17 234 lb (106.1 kg)              We Performed the Following     Basic metabolic panel     CARDIO  ADULT REFERRAL          Today's Medication Changes          These changes are accurate as of 10/11/18  3:37 PM.  If you have any questions, ask your nurse or doctor.               Start taking these medicines.        Dose/Directions    atorvastatin 40 MG tablet   Commonly known as:  " LIPITOR   Used for:  Unstable angina (H)   Started by:  Jimena Grider PA-C        Dose:  40 mg   Take 1 tablet (40 mg) by mouth daily   Quantity:  30 tablet   Refills:  1       doxycycline monohydrate 100 MG capsule   Used for:  Tick bite of abdominal wall, initial encounter   Started by:  Jimena Grider PA-C        Dose:  100 mg   Take 1 capsule (100 mg) by mouth 2 times daily for 21 days   Quantity:  42 capsule   Refills:  0       hydrochlorothiazide 12.5 MG Tabs tablet   Used for:  Benign essential hypertension   Started by:  Jimena Grider PA-C        Dose:  12.5 mg   Take 1 tablet (12.5 mg) by mouth daily   Quantity:  30 tablet   Refills:  1            Where to get your medicines      These medications were sent to [facility] [address]  [address] [address] [address]     Phone:  [phone]     atorvastatin 40 MG tablet    doxycycline monohydrate 100 MG capsule    hydrochlorothiazide 12.5 MG Tabs tablet                Primary Care Provider Office Phone # Fax #    [facility] [phone] [address] [address] [address]        Equal Access to Services     SHERICE ANDERSON AH: Hadii franki ku hadasho Soomaali, waaxda luqadaha, qaybta kaalmada adeegyada, waxay genain haybeban krys mustafa. So [facility] [phone].    ATENCIÓN: Si habla español, tiene a cortez disposición servicios gratuitos de asistencia lingüística. LlJ.[facility] [phone].    We comply with applicable federal civil rights laws and [place] laws. We do not discriminate on the basis of race, color, national origin, age, disability, sex, sexual orientation, or gender identity.            Thank you!     Thank you for choosing [facility]  for your care. Our goal is always to provide you with excellent care. Hearing back from our patients is one way we can continue to improve our services. Please take a few minutes to complete the written survey that you  may receive in the mail after your visit with us. Thank you!             Your Updated Medication List - Protect others around you: Learn how to safely use, store and throw away your medicines at www.disposemymeds.org.          This list is accurate as of 10/11/18  3:37 PM.  Always use your most recent med list.                   Brand Name Dispense Instructions for use Diagnosis    atorvastatin 40 MG tablet    LIPITOR    30 tablet    Take 1 tablet (40 mg) by mouth daily    Unstable angina (H)       blood glucose lancets standard    no brand specified    100 each    Use to test blood sugar 3 times daily or as directed.    Type 2 diabetes mellitus with hyperglycemia, without long-term current use of insulin (H)       blood glucose monitoring meter device kit    no brand specified    1 kit    Use to test blood sugar 3 times daily or as directed.    Type 2 diabetes mellitus with hyperglycemia, without long-term current use of insulin (H)       blood glucose monitoring test strip    no brand specified    100 strip    Use to test blood sugars 3 times daily or as directed    Type 2 diabetes mellitus with hyperglycemia, without long-term current use of insulin (H)       doxycycline monohydrate 100 MG capsule     42 capsule    Take 1 capsule (100 mg) by mouth 2 times daily for 21 days    Tick bite of abdominal wall, initial encounter       famotidine 20 MG tablet    PEPCID     Take 20 mg by mouth        hydrochlorothiazide 12.5 MG Tabs tablet     30 tablet    Take 1 tablet (12.5 mg) by mouth daily    Benign essential hypertension       lisinopril 20 MG tablet    PRINIVIL/ZESTRIL    30 tablet    Take 1 tablet (20 mg) by mouth daily    Benign essential hypertension       metFORMIN 500 MG 24 hr tablet    GLUCOPHAGE-XR    120 tablet    Take 1 tablet (500 mg) by mouth daily (with dinner) For a few days. Then increase to 500 mg two times daily x1 week, then increase to 1000 mg (2 tablets) two times daily    Type 2 diabetes  mellitus with hyperglycemia, without long-term current use of insulin (H)

## 2018-10-11 NOTE — PROGRESS NOTES
SUBJECTIVE:   Karan Josue is a 48 year old male who presents to clinic today for the following health issues:    SUBJECTIVE:  Karan Josue is a 48 year old male who is here because of a tick bite.   The tick was first noticed on the right lower abdomin this morning at 4:15 am. Karan was in [facility] at time of bite.  Previous antibiotic(s) given no    Associated symptoms:    Fever: no noted fevers    Headache: no  Sore throat: no  Fatigue: Slight fatigue  Nausea/vomiting: no  Muscle aches: Legs have been really sore  Joint pain: no  Swollen lymph glands: no  Stiff neck: yes  Other: no      Hospital Follow-up Visit:    Hospital/Nursing Home/IP Rehab Facility: [facility]  Date of Admission: 10/5/18  Date of Discharge: 10/6/18  Reason(s) for Admission: unstable angina            Problems taking medications regularly:  Did not start pepcid as he does not have acid reflux       Medication changes since discharge: pepcid recommended       Problems adhering to non-medication therapy:  None    Summary of hospitalization:  [facility] discharge summary reviewed  Diagnostic Tests/Treatments reviewed.  Follow up needed: BMP  Other Healthcare Providers Involved in Patient s Care:         None  Update since discharge: improved.     Post Discharge Medication Reconciliation: discharge medications reconciled and changed, per note/orders (see AVS).  Plan of care communicated with patient     Coding guidelines for this visit:  Type of Medical   Decision Making Face-to-Face Visit       within 7 Days of discharge Face-to-Face Visit        within 14 days of discharge   Moderate Complexity [id number]   High Complexity [id number]          SUMMARY OF HOSPITAL COURSE:   Karan Josue Jr. is a 48 y.o. old male with past medical history of hypertension, diabetes diagnosed 2 week ago , bipolar disorder with positive stress echo done 10/4/2018 presented to [facility] with complaints of chest pain x 2  weeks and more x 1 day and shortness of breath EKG did not show ST segment changes first troponin was negative and subsided with sub-lingual nitroglycerin only to return in the morning 10/6/2018. Cardiology was consulted and patient underwent coronary angiogram as mentioned above without any significant coronary artery disease though he has insignificant coronary artery disease. As per recommendation of cardiology he did underwent CT angiogram to rule out pulmonary embolism. There is no pulmonary embolism visualized on CT scan.   He does complain of acid reflux symptoms therefore famotidine is started. If famotidine is not controlling his symptoms proton pump inhibitors can be started.     CTA   Result Impression   CONCLUSION:  1.  No pulmonary embolus.  2.  Clear lungs.         The left ventricular size is normal. The left ventricular systolic   function is normal. LV systolic pressure is normal. LV end diastolic   pressure is normal. There are no wall motion abnormalities in the left   ventricle.    The ejection fraction is greater than 55% by visual estimate.    The ejection fraction is calculated to be 57%.    Ost LAD lesion 25% stenosed.    Prox LAD lesion 20% stenosed.    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 Or when he takes a full breath it doesn't give oxygen the same way  He had cough when he had the chest tightness and not before or after  He gets tired faster than he used to, especially with  "exercise  Denies heartburn symptoms     Has been checking blood sugars  AM 220s, goes down to 140s before dinner  Metformin: he restarted 2 with dinner 2 days ago (had stopped in hospital)    His legs are still achey/swollen at the shins by the end of the day  He has tingling/numbness in feet sometimes    Problem list and histories reviewed & adjusted, as indicated.  Additional history: as documented    Patient Active Problem List   Diagnosis     Family history of prostate cancer     Adjustment disorder with mixed anxiety and depressed mood     Bipolar I disorder (H)     Diabetes mellitus, type 2 (H)     Obesity (BMI 35.0-39.9) with comorbidity (H)     Unstable angina (H)     Benign essential hypertension     Past Surgical History:   Procedure Laterality Date     NO HISTORY OF SURGERY         Social History   Substance Use Topics     Smoking status: Never Smoker     Smokeless tobacco: Never Used     Alcohol use Yes      Comment: occasional     Family History   Problem Relation Age of Onset     C.A.D. Father 58     nonfatal MI x 2     Prostate Cancer Father 59     Diabetes Father      Prostate Cancer Maternal Grandfather 69     Prostate Cancer Maternal Uncle 48         Labs reviewed in EPIC    Reviewed and updated as needed this visit by clinical staff  Tobacco  Allergies  Meds  Problems  Med Hx  Surg Hx  Fam Hx  Soc Hx        Reviewed and updated as needed this visit by Provider  Allergies  Meds  Problems         ROS:  Other than noted above, general, HEENT, respiratory, cardiac, MS, and gastrointestinal systems are negative.     OBJECTIVE:     /90  Pulse 90  Temp 97.6  F (36.4  C) (Tympanic)  Ht 6' 0.24\" (1.835 m)  Wt 260 lb (117.9 kg)  SpO2 95%  BMI 35.02 kg/m2  Body mass index is 35.02 kg/(m^2).  GENERAL: healthy, alert and no distress  NECK: no adenopathy, no asymmetry, masses, or scars and thyroid normal to palpation  RESP: lungs clear to auscultation - no rales, rhonchi or wheezes  CV: " regular rate and rhythm, normal S1 S2, no S3 or S4, no murmur, click or rub, no peripheral edema and peripheral pulses strong  ABDOMEN: soft, nontender, no hepatosplenomegaly, no masses and bowel sounds normal  MS: no gross musculoskeletal defects noted, trace bilateral shin edema    SKIN: POSITIVE Rash description:     Location: right side of lower abdomen     Color: erythematous with scab in middle     Maximum diameter  1.25 inches    DIABETIC FOOT EXAM: normal DP and PT pulses, no trophic changes or ulcerative lesions and reduced sensation at right great toe and mildly so at left great toe     ASSESSMENT/PLAN:     ASSESSMENT/PLAN:      ICD-10-CM    1. Type 2 diabetes mellitus with hyperglycemia, without long-term current use of insulin (H) E11.65 aspirin 81 MG tablet   2. Unstable angina (H) I20.0 atorvastatin (LIPITOR) 40 MG tablet     CARDIO  ADULT REFERRAL   3. Benign essential hypertension I10 hydrochlorothiazide 12.5 MG TABS tablet     Basic metabolic panel   4. Tick bite of abdominal wall, initial encounter S30.861A doxycycline monohydrate 100 MG capsule    W57.XXXA    5. SOB (shortness of breath) R06.02 General PFT Lab (Please always keep checked)     Pulmonary Function Test     Multiple concerns today. Recommended close follow up.  - will treat for lyme disease/cellulitis with doxy given appearance of lesion/rash, patient brings in deer tick, and unknown length of attachment  - patient continuing symptoms after hospitalization but milder - will trial treating for atypical heartburn. Red flag signs to be seen urgently were discussed. Did recommend cardio follow up. Consider pulmonary function testing given his history of as  and his symptoms of SALCEDO. Also consider deconditioning which we discussed, and weight contributing to difficulty taking deep breaths at times. Hoping that better control of DM and hypertension will also help.  - will add lipitor for cardiac protection. He is already  taking baby aspirin.   - Will add HCTZ for further control of blood pressure . If he tolerates well will change to combo pill    Red flag signs to be seen urgently were discussed.    Patient Instructions   Follow up in 3-4 weeks, sooner if needed    Start doxycycline for 3 weeks to treat lyme disease    Continue lisinopril 20 mg  Start hydrochlorothiazide 12.5 mg for blood pressure     Start lipitor for heart protection    Do start the pepcid to treat heartburn  We will see if this helps symptoms     Pulmonary function testing - [phone]     Jimena Grider PA-C  [facility]

## 2018-10-11 NOTE — LETTER
October 12, 2018      Karan Josue  [address] [address]        Dear ,    We are writing to inform you of your test results.     Labs are stable. Continue with current plan.   Jimena Grider PA-C     Resulted Orders   Basic metabolic panel   Result Value Ref Range    Sodium 135 133 - 144 mmol/L    Potassium 4.0 3.4 - 5.3 mmol/L    Chloride 103 94 - 109 mmol/L    Carbon Dioxide 26 20 - 32 mmol/L    Anion Gap 6 3 - 14 mmol/L    Glucose 236 (H) 70 - 99 mg/dL    Urea Nitrogen 13 7 - 30 mg/dL    Creatinine 0.87 0.66 - 1.25 mg/dL    GFR Estimate >90 >60 mL/min/1.7m2      Comment:      Non  GFR Calc    GFR Estimate If Black >90 >60 mL/min/1.7m2      Comment:       GFR Calc    Calcium 9.0 8.5 - 10.1 mg/dL       If you have any questions or concerns, please call the clinic at the number listed above.       Sincerely,        Jimena Grider PA-C

## 2018-10-11 NOTE — PATIENT INSTRUCTIONS
Follow up in 3-4 weeks, sooner if needed    Start doxycycline for 3 weeks to treat lyme disease    Continue lisinopril 20 mg  Start hydrochlorothiazide 12.5 mg for blood pressure     Start lipitor for heart protection    Do start the pepcid to treat heartburn  We will see if this helps symptoms     Pulmonary function testing - [phone]

## 2018-10-18 ENCOUNTER — HOSPITAL ENCOUNTER (OUTPATIENT)
Dept: RESPIRATORY THERAPY | Facility: CLINIC | Age: 48
Discharge: HOME | End: 2018-10-18
Attending: INTERNAL MEDICINE | Admitting: INTERNAL MEDICINE
Payer: COMMERCIAL

## 2018-10-18 DIAGNOSIS — R06.02 SOB (SHORTNESS OF BREATH): ICD-10-CM

## 2018-10-18 PROCEDURE — 25000125 ZZHC RX 250: Performed by: PHYSICIAN ASSISTANT

## 2018-10-18 PROCEDURE — 94726 PLETHYSMOGRAPHY LUNG VOLUMES: CPT | Mod: 26 | Performed by: INTERNAL MEDICINE

## 2018-10-18 PROCEDURE — 94729 DIFFUSING CAPACITY: CPT

## 2018-10-18 PROCEDURE — 94060 EVALUATION OF WHEEZING: CPT | Mod: 26 | Performed by: INTERNAL MEDICINE

## 2018-10-18 PROCEDURE — 94726 PLETHYSMOGRAPHY LUNG VOLUMES: CPT

## 2018-10-18 PROCEDURE — 94729 DIFFUSING CAPACITY: CPT | Mod: 26 | Performed by: INTERNAL MEDICINE

## 2018-10-18 PROCEDURE — 94060 EVALUATION OF WHEEZING: CPT

## 2018-10-18 RX ORDER — ALBUTEROL SULFATE 0.83 MG/ML
2.5 SOLUTION RESPIRATORY (INHALATION) ONCE
Status: COMPLETED | OUTPATIENT
Start: 2018-10-18 | End: 2018-10-18

## 2018-10-18 RX ADMIN — ALBUTEROL SULFATE 2.5 MG: 2.5 SOLUTION RESPIRATORY (INHALATION) at 11:30

## 2018-10-20 LAB
DLCOCOR-%PRED-PRE: 99 %
DLCOCOR-PRE: 31.47 ML/MIN/MMHG
DLCOUNC-%PRED-PRE: 100 %
DLCOUNC-PRE: 31.65 ML/MIN/MMHG
DLCOUNC-PRED: 31.55 ML/MIN/MMHG
ERV-%PRED-PRE: 45 %
ERV-PRE: 0.51 L
ERV-PRED: 1.11 L
EXPTIME-PRE: 6.4 SEC
FEF2575-%PRED-POST: 88 %
FEF2575-%PRED-PRE: 79 %
FEF2575-POST: 3.31 L/SEC
FEF2575-PRE: 2.99 L/SEC
FEF2575-PRED: 3.74 L/SEC
FEFMAX-%PRED-PRE: 96 %
FEFMAX-PRE: 9.96 L/SEC
FEFMAX-PRED: 10.32 L/SEC
FEV1-%PRED-PRE: 78 %
FEV1-PRE: 3.29 L
FEV1FEV6-PRE: 79 %
FEV1FEV6-PRED: 80 %
FEV1FVC-PRE: 82 %
FEV1FVC-PRED: 78 %
FEV1SVC-PRE: 79 %
FEV1SVC-PRED: 75 %
FIFMAX-PRE: 3.01 L/SEC
FRCPLETH-%PRED-PRE: 91 %
FRCPLETH-PRE: 3.34 L
FRCPLETH-PRED: 3.66 L
FVC-%PRED-PRE: 75 %
FVC-PRE: 4.04 L
FVC-PRED: 5.37 L
IC-%PRED-PRE: 79 %
IC-PRE: 3.58 L
IC-PRED: 4.49 L
RVPLETH-%PRED-PRE: 121 %
RVPLETH-PRE: 2.77 L
RVPLETH-PRED: 2.28 L
TLCPLETH-%PRED-PRE: 91 %
TLCPLETH-PRE: 6.92 L
TLCPLETH-PRED: 7.58 L
VA-%PRED-PRE: 80 %
VA-PRE: 5.81 L
VC-%PRED-PRE: 74 %
VC-PRE: 4.15 L
VC-PRED: 5.6 L

## 2018-12-17 DIAGNOSIS — E11.65 TYPE 2 DIABETES MELLITUS WITH HYPERGLYCEMIA, WITHOUT LONG-TERM CURRENT USE OF INSULIN (H): ICD-10-CM

## 2018-12-17 DIAGNOSIS — I10 BENIGN ESSENTIAL HYPERTENSION: ICD-10-CM

## 2018-12-17 NOTE — TELEPHONE ENCOUNTER
"Requested Prescriptions   Pending Prescriptions Disp Refills     lisinopril (PRINIVIL/ZESTRIL) 20 MG tablet [Pharmacy Med Name: LISINOPRIL 20MG TABS] 30 tablet 1     Sig: TAKE ONE TABLET BY MOUTH EVERY DAY    ACE Inhibitors (Including Combos) Protocol Failed - 12/17/2018 12:52 PM       Failed - Blood pressure under 140/90 in past 12 months    BP Readings from Last 3 Encounters:   10/11/18 142/90   09/26/18 150/90   03/02/17 127/87                Passed - Recent (12 mo) or future (30 days) visit within the authorizing provider's specialty    Patient had office visit in the last 12 months or has a visit in the next 30 days with authorizing provider or within the authorizing provider's specialty.  See \"Patient Info\" tab in inbasket, or \"Choose Columns\" in Meds & Orders section of the refill encounter.             Passed - Patient is age 18 or older       Passed - Normal serum creatinine on file in past 12 months    Recent Labs   Lab Test 10/11/18  1546   CR 0.87            Passed - Normal serum potassium on file in past 12 months    Recent Labs   Lab Test 10/11/18  1546   POTASSIUM 4.0             Last Written Prescription Date:  09/26/18  Last Fill Quantity: 30,  # refills: 1   Last office visit: 10/11/2018 with prescribing provider:     Future Office Visit:      "

## 2018-12-18 NOTE — TELEPHONE ENCOUNTER
Routing refill request to provider for review/approval because:  Blood pressure not in range.    Asia Prince RN

## 2018-12-19 RX ORDER — LISINOPRIL 20 MG/1
TABLET ORAL
Qty: 30 TABLET | Refills: 1 | OUTPATIENT
Start: 2018-12-19

## 2018-12-19 RX ORDER — METFORMIN HCL 500 MG
500 TABLET, EXTENDED RELEASE 24 HR ORAL
Qty: 120 TABLET | Refills: 0 | Status: CANCELLED | OUTPATIENT
Start: 2018-12-19

## 2018-12-19 NOTE — TELEPHONE ENCOUNTER
Appointment made for 12/21/18. He said that he has enough medication to last until then.  Matt Edmondson RN

## 2018-12-19 NOTE — TELEPHONE ENCOUNTER
Please confirm dosing of metformin, as he had just started it (we have taper dosing listed)  From last visit:  Continue lisinopril 20 mg  Start hydrochlorothiazide 12.5 mg for blood pressure  - is he still taking this?  He is due for labwork, blood pressure check, office visit - new diagnosis diabetes    Can refill to get him through to appointment.  Jimena Grider PA-C

## 2018-12-20 NOTE — PROGRESS NOTES
SUBJECTIVE:   Karan Josue is a 48 year old male who presents to clinic today for the following health issues:    Diabetes Follow-up - metformin 2 tablets two times daily     Patient is checking blood sugars: twice daily.    Blood sugar testing frequency justification: Adjustment of medication(s)  Results are as follows:         am - 185's - 220, states that it seems to be higher in the morning         suppertime - 150's    Diabetic concerns: None and blood sugar frequently over 200 in the morning before breakfast, evening sugars are less then 200's     Symptoms of hypoglycemia (low blood sugar): none     Paresthesias (numbness or burning in feet) or sores: No     Date of last diabetic eye exam: 2 years ago, knows he is due for recheck    Diabetes Management Resources    Hyperlipidemia Follow-Up - lipitor added      Rate your low fat/cholesterol diet?: Tries to    Taking statin?  Yes, no muscle aches from statin    Other lipid medications/supplements?:  none    Hypertension Follow-up - lisinopril, added HCTZ       Outpatient blood pressures are being checked at home.  Results are 130's/80's, he did say last week was a lot higher then it normally is, he had one reading of 196/106.    Low Salt Diet: no added salt    BP Readings from Last 2 Encounters:   12/21/18 150/90   10/11/18 142/90     Hemoglobin A1C (%)   Date Value   12/21/2018 9.7 (H)   09/26/2018 10.8 (H)     LDL Cholesterol Calculated (mg/dL)   Date Value   09/26/2018 158 (H)       Amount of exercise or physical activity: 6-7 days/week for an average of 30-45 minutes    Problems taking medications regularly: No    Medication side effects: none    Diet: regular (no restrictions) and low fat/cholesterol    * treated last visit for atypical heartburn (pepcid), workup for SALCEDO  PFT:   The pulmonary function tests overall look okay, no real restriction or obstruction of breathing. I do think that slowly increasing exercise and losing weight will help you feel  like you can take deep breaths better.  * hasn't been taking pepcid regularly  No reflux. No SOB. Symptoms have resolved    * went to dermatologist yesterday to visit - will be having cyst on scalp removed  At that visit blood pressure was 134/83 - this is the lowest he has had. Has been running high still    * he is tired in the evenings.   He sleeps 3-4 hours at night, wakes up after a couple hours  Has been like this for years  He snores, no known apneic spells      Problem list and histories reviewed & adjusted, as indicated.  Additional history: as documented    Patient Active Problem List   Diagnosis     Family history of prostate cancer     Adjustment disorder with mixed anxiety and depressed mood     Bipolar I disorder (H)     Diabetes mellitus, type 2 (H)     Obesity (BMI 35.0-39.9) with comorbidity (H)     Unstable angina (H)     Benign essential hypertension     Past Surgical History:   Procedure Laterality Date     NO HISTORY OF SURGERY         Social History     Tobacco Use     Smoking status: Never Smoker     Smokeless tobacco: Never Used   Substance Use Topics     Alcohol use: Yes     Comment: occasional     Family History   Problem Relation Age of Onset     C.A.D. Father 58        nonfatal MI x 2     Prostate Cancer Father 59     Diabetes Father      Prostate Cancer Maternal Grandfather 69     Prostate Cancer Maternal Uncle 48         BP Readings from Last 3 Encounters:   12/21/18 150/90   10/11/18 142/90   09/26/18 150/90    Wt Readings from Last 3 Encounters:   12/21/18 122.5 kg (270 lb 1.6 oz)   10/11/18 117.9 kg (260 lb)   09/26/18 119.2 kg (262 lb 11.2 oz)                    Reviewed and updated as needed this visit by clinical staff  Tobacco  Allergies  Meds  Problems  Med Hx  Surg Hx  Fam Hx  Soc Hx        Reviewed and updated as needed this visit by Provider  Allergies  Meds  Problems  Med Hx  Surg Hx  Fam Hx         ROS:  Other than noted above, general, HEENT, respiratory,  "cardiac, MS, and gastrointestinal systems are negative.     OBJECTIVE:     /90   Pulse 65   Temp 97.7  F (36.5  C) (Tympanic)   Ht 1.835 m (6' 0.24\")   Wt 122.5 kg (270 lb 1.6 oz)   BMI 36.39 kg/m    Body mass index is 36.39 kg/m .  GENERAL: healthy, alert and no distress  RESP: lungs clear to auscultation - no rales, rhonchi or wheezes  CV: regular rate and rhythm, normal S1 S2, no S3 or S4, no murmur, click or rub, no peripheral edema and peripheral pulses strong  ABDOMEN: soft, nontender, no hepatosplenomegaly, no masses and bowel sounds normal  MS: no gross musculoskeletal defects noted, no edema    ASSESSMENT/PLAN:     ASSESSMENT/PLAN:      ICD-10-CM    1. Type 2 diabetes mellitus with hyperglycemia, without long-term current use of insulin (H) E11.65 **A1C FUTURE 1yr     Albumin Random Urine Quantitative with Creat Ratio     DIABETES EDUCATOR REFERRAL     dulaglutide (TRULICITY) 0.75 MG/0.5ML pen   2. Benign essential hypertension I10 **Basic metabolic panel FUTURE 1yr     lisinopril-hydrochlorothiazide (PRINZIDE/ZESTORETIC) 20-12.5 MG tablet   3. Snoring R06.83 SLEEP EVALUATION & MANAGEMENT REFERRAL - [facility]  [phone] (Age 2 and up)     Fit test given  We discussed risks/benefits/side effects of GLP1 agonists, he is willing to start this  He is interested in diabetic educator visit - he would like his wife to come as well  Sleep apnea vs other sleep disorder - he is agreeable to sleep clinic evaluation  Will work to better control blood pressure, increase meds    Patient Instructions   Continue metformin  Start trulicity injectable (or other covered medication)  For blood pressure: you are taking lisinopril 20 mg + HCTZ 12.5 mg. We will start combination pill lisinopril-HCTZ 20-12.5. You will take 2 of these daily - lisinopril 40 + HCTZ 25 mg  Blood pressure check in 2 weeks  mychart or call with blood sugar readings in 3-4 weeks  Follow up in 3 " months    Diabetic educator  Sleep clinic evaluation    Jimena Grider PA-C  [facility]

## 2018-12-21 ENCOUNTER — OFFICE VISIT (OUTPATIENT)
Dept: FAMILY MEDICINE | Facility: CLINIC | Age: 48
End: 2018-12-21
Payer: COMMERCIAL

## 2018-12-21 VITALS
SYSTOLIC BLOOD PRESSURE: 150 MMHG | BODY MASS INDEX: 36.59 KG/M2 | HEART RATE: 65 BPM | TEMPERATURE: 97.7 F | HEIGHT: 72 IN | DIASTOLIC BLOOD PRESSURE: 90 MMHG | WEIGHT: 270.1 LBS

## 2018-12-21 DIAGNOSIS — E11.65 TYPE 2 DIABETES MELLITUS WITH HYPERGLYCEMIA, WITHOUT LONG-TERM CURRENT USE OF INSULIN (H): Primary | ICD-10-CM

## 2018-12-21 DIAGNOSIS — R06.83 SNORING: ICD-10-CM

## 2018-12-21 DIAGNOSIS — I10 BENIGN ESSENTIAL HYPERTENSION: ICD-10-CM

## 2018-12-21 DIAGNOSIS — E11.65 TYPE 2 DIABETES MELLITUS WITH HYPERGLYCEMIA, WITHOUT LONG-TERM CURRENT USE OF INSULIN (H): ICD-10-CM

## 2018-12-21 LAB
ANION GAP SERPL CALCULATED.3IONS-SCNC: 7 MMOL/L (ref 3–14)
BUN SERPL-MCNC: 10 MG/DL (ref 7–30)
CALCIUM SERPL-MCNC: 8.1 MG/DL (ref 8.5–10.1)
CHLORIDE SERPL-SCNC: 103 MMOL/L (ref 94–109)
CO2 SERPL-SCNC: 27 MMOL/L (ref 20–32)
CREAT SERPL-MCNC: 0.71 MG/DL (ref 0.66–1.25)
CREAT UR-MCNC: 100 MG/DL
GFR SERPL CREATININE-BSD FRML MDRD: >90 ML/MIN/{1.73_M2}
GLUCOSE SERPL-MCNC: 233 MG/DL (ref 70–99)
HBA1C MFR BLD: 9.7 % (ref 0–5.6)
MICROALBUMIN UR-MCNC: 29 MG/L
MICROALBUMIN/CREAT UR: 28.9 MG/G CR (ref 0–17)
POTASSIUM SERPL-SCNC: 4 MMOL/L (ref 3.4–5.3)
SODIUM SERPL-SCNC: 137 MMOL/L (ref 133–144)

## 2018-12-21 PROCEDURE — 99214 OFFICE O/P EST MOD 30 MIN: CPT | Performed by: PHYSICIAN ASSISTANT

## 2018-12-21 PROCEDURE — 36415 COLL VENOUS BLD VENIPUNCTURE: CPT | Performed by: PHYSICIAN ASSISTANT

## 2018-12-21 PROCEDURE — 82043 UR ALBUMIN QUANTITATIVE: CPT | Performed by: PHYSICIAN ASSISTANT

## 2018-12-21 PROCEDURE — 83036 HEMOGLOBIN GLYCOSYLATED A1C: CPT | Performed by: PHYSICIAN ASSISTANT

## 2018-12-21 PROCEDURE — 80048 BASIC METABOLIC PNL TOTAL CA: CPT | Performed by: PHYSICIAN ASSISTANT

## 2018-12-21 RX ORDER — LISINOPRIL AND HYDROCHLOROTHIAZIDE 20; 25 MG/1; MG/1
1 TABLET ORAL DAILY
Qty: 90 TABLET | Refills: 3 | Status: CANCELLED | OUTPATIENT
Start: 2018-12-21 | End: 2019-12-21

## 2018-12-21 RX ORDER — LISINOPRIL AND HYDROCHLOROTHIAZIDE 12.5; 2 MG/1; MG/1
2 TABLET ORAL DAILY
Qty: 180 TABLET | Refills: 1 | Status: SHIPPED | OUTPATIENT
Start: 2018-12-21 | End: 2020-06-29

## 2018-12-21 ASSESSMENT — MIFFLIN-ST. JEOR: SCORE: 2127.04

## 2018-12-21 NOTE — LETTER
December 24, 2018      Karan Josue  [address] [place] [id number]        Dear Karan,    We are writing to inform you of your test results.    The basic metabolic panel (electrolytes, glucose, kidney function) is normal/stable.   The urine microalbumin test is mildly elevated. This means there is some protein in the urine and your kidneys are being affected by the diabetes. Controlling the diabetes and keeping blood pressure, sugars, and fats is the best treatment in order to keep this stable. You should avoid NSAIDs like ibuprofen as much as possible. Anybody that has this should be on lisinopril-as you already are-since this is protective for the kidneys.     If you have any questions or concerns, please call the clinic at the number listed above.     Sincerely,      Jimena Grider PA-C/sc                                            Resulted Orders   **A1C FUTURE 1yr   Result Value Ref Range    Hemoglobin A1C 9.7 (H) 0 - 5.6 %      Comment:      Normal <5.7% Prediabetes 5.7-6.4%  Diabetes 6.5% or higher - adopted from ADA   consensus guidelines.     **Basic metabolic panel FUTURE 1yr   Result Value Ref Range    Sodium 137 133 - 144 mmol/L    Potassium 4.0 3.4 - 5.3 mmol/L    Chloride 103 94 - 109 mmol/L    Carbon Dioxide 27 20 - 32 mmol/L    Anion Gap 7 3 - 14 mmol/L    Glucose 233 (H) 70 - 99 mg/dL    Urea Nitrogen 10 7 - 30 mg/dL    Creatinine 0.71 0.66 - 1.25 mg/dL    GFR Estimate >90 >60 mL/min/[1.73_m2]      Comment:      Non  GFR Calc  Starting 12/18/2018, serum creatinine based estimated GFR (eGFR) will be   calculated using the Chronic Kidney Disease Epidemiology Collaboration   (CKD-EPI) equation.      GFR Estimate If Black >90 >60 mL/min/[1.73_m2]      Comment:       GFR Calc  Starting 12/18/2018, serum creatinine based estimated GFR (eGFR) will be   calculated using the Chronic Kidney Disease Epidemiology Collaboration   (CKD-EPI) equation.      Calcium 8.1 (L) 8.5 -  10.1 mg/dL   Albumin Random Urine Quantitative with Creat Ratio   Result Value Ref Range    Creatinine Urine 100 mg/dL    Albumin Urine mg/L 29 mg/L    Albumin Urine mg/g Cr 28.90 (H) 0 - 17 mg/g Cr

## 2018-12-24 RX ORDER — METFORMIN HCL 500 MG
1000 TABLET, EXTENDED RELEASE 24 HR ORAL 2 TIMES DAILY WITH MEALS
Qty: 360 TABLET | Refills: 0 | Status: SHIPPED | OUTPATIENT
Start: 2018-12-24 | End: 2018-12-24

## 2018-12-24 RX ORDER — METFORMIN HCL 500 MG
1000 TABLET, EXTENDED RELEASE 24 HR ORAL 2 TIMES DAILY WITH MEALS
Qty: 360 TABLET | Refills: 0 | Status: SHIPPED | OUTPATIENT
Start: 2018-12-24 | End: 2019-02-04 | Stop reason: SINTOL

## 2018-12-24 NOTE — TELEPHONE ENCOUNTER
"metFORMIN (GLUCOPHAGE-XR) 500 MG 24 hr tablet      Last Written Prescription Date:  9/26/18  Last Fill Quantity: 120,   # refills: 1  Last Office Visit: 12/21/18  Future Office visit:       Requested Prescriptions   Pending Prescriptions Disp Refills     metFORMIN (GLUCOPHAGE-XR) 500 MG 24 hr tablet 120 tablet 1     Sig: Take 1 tablet (500 mg) by mouth daily (with dinner) For a few days. Then increase to 500 mg two times daily x1 week, then increase to 1000 mg (2 tablets) two times daily    Biguanide Agents Failed - 12/21/2018  4:28 PM       Failed - Blood pressure less than 140/90 in past 6 months    BP Readings from Last 3 Encounters:   12/21/18 150/90   10/11/18 142/90   09/26/18 150/90                Passed - Patient has documented LDL within the past 12 mos.    Recent Labs   Lab Test 09/26/18  0835   *            Passed - Patient has had a Microalbumin in the past 15 mos.    Recent Labs   Lab Test 12/21/18  0923   MICROL 29   UMALCR 28.90*            Passed - Patient is age 10 or older       Passed - Patient has documented A1c within the specified period of time.    If HgbA1C is 8 or greater, it needs to be on file within the past 3 months.  If less than 8, must be on file within the past 6 months.     Recent Labs   Lab Test 12/21/18  0917   A1C 9.7*            Passed - Patient's CR is NOT>1.4 OR Patient's EGFR is NOT<45 within past 12 mos.    Recent Labs   Lab Test 12/21/18  0917   GFRESTIMATED >90   GFRESTBLACK >90       Recent Labs   Lab Test 12/21/18  0917   CR 0.71            Passed - Patient does NOT have a diagnosis of CHF.       Passed - Recent (6 mo) or future (30 days) visit within the authorizing provider's specialty    Patient had office visit in the last 6 months or has a visit in the next 30 days with authorizing provider or within the authorizing provider's specialty.  See \"Patient Info\" tab in inbasket, or \"Choose Columns\" in Meds & Orders section of the refill encounter.              "

## 2018-12-26 ENCOUNTER — OFFICE VISIT (OUTPATIENT)
Dept: FAMILY MEDICINE | Facility: CLINIC | Age: 48
End: 2018-12-26
Payer: COMMERCIAL

## 2018-12-26 VITALS
HEIGHT: 72 IN | RESPIRATION RATE: 16 BRPM | WEIGHT: 268.8 LBS | BODY MASS INDEX: 36.41 KG/M2 | OXYGEN SATURATION: 98 % | TEMPERATURE: 97.4 F | SYSTOLIC BLOOD PRESSURE: 148 MMHG | HEART RATE: 81 BPM | DIASTOLIC BLOOD PRESSURE: 92 MMHG

## 2018-12-26 DIAGNOSIS — K04.7 INFECTED TOOTH: Primary | ICD-10-CM

## 2018-12-26 PROCEDURE — 99213 OFFICE O/P EST LOW 20 MIN: CPT | Performed by: NURSE PRACTITIONER

## 2018-12-26 ASSESSMENT — MIFFLIN-ST. JEOR: SCORE: 2117.27

## 2018-12-26 NOTE — PROGRESS NOTES
SUBJECTIVE:   Karan Josue is a 48 year old male who presents to clinic today for the following health issues:      Dental Pain      Duration: 2 days     Description (location/character/radiation): upper right dental pain     Intensity:  8/10    Accompanying signs and symptoms: swelling    History (similar episodes/previous evaluation): YES-typical with root canal, will move into ear and have severe ear pain    Precipitating or alleviating factors: Had a root canal done, has upcoming appointment to replace the temporary filling 1/4/18. Last week was at dentist, no clear infection. Had xray done. Dentist could not say for sure there was no infection. Was instructed to see PCP for antibiotics if signs of infection developed. Reports 2 days of puffy right cheek with increased pain.     Therapies tried and outcome: ibuprofen and excederin-alternating      Problem list and histories reviewed & adjusted, as indicated.  Additional history: as documented    Patient Active Problem List   Diagnosis     Family history of prostate cancer     Adjustment disorder with mixed anxiety and depressed mood     Bipolar I disorder (H)     Diabetes mellitus, type 2 (H)     Obesity (BMI 35.0-39.9) with comorbidity (H)     Unstable angina (H)     Benign essential hypertension     Past Surgical History:   Procedure Laterality Date     NO HISTORY OF SURGERY         Social History     Tobacco Use     Smoking status: Never Smoker     Smokeless tobacco: Never Used   Substance Use Topics     Alcohol use: Yes     Comment: occasional     Family History   Problem Relation Age of Onset     C.A.D. Father 58        nonfatal MI x 2     Prostate Cancer Father 59     Diabetes Father      Prostate Cancer Maternal Grandfather 69     Prostate Cancer Maternal Uncle 48           Reviewed and updated as needed this visit by clinical staff  Tobacco  Allergies  Meds  Med Hx  Surg Hx  Fam Hx  Soc Hx      Reviewed and updated as needed this visit by  Provider         ROS:  Constitutional, HEENT, cardiovascular, pulmonary, gi and gu systems are negative, except as otherwise noted.    OBJECTIVE:     BP (!) 148/92   Pulse 81   Temp 97.4  F (36.3  C) (Tympanic)   Resp 16   Ht 1.829 m (6')   Wt 121.9 kg (268 lb 12.8 oz)   SpO2 98%   BMI 36.46 kg/m    Body mass index is 36.46 kg/m .  GENERAL: healthy, alert and no distress  HENT: normal cephalic/atraumatic, oropharynx clear, oral mucous membranes moist and temporary filling visualized, no obvious gum swelling or abscess.   NECK: no adenopathy, no asymmetry, masses, or scars and thyroid normal to palpation    Diagnostic Test Results:  none     ASSESSMENT/PLAN:       ICD-10-CM    1. Infected tooth K04.7 amoxicillin-clavulanate (AUGMENTIN) 875-125 MG tablet       CONSULTATION/REFERRAL to DENTIST 1/4/19 as planned.     FUTURE APPOINTMENTS:       - RETURN TO CLINIC for new or worsening symptoms.     Patient Instructions     Patient Education     Abscess (Antibiotic Treatment Only)  An abscess (sometimes called a  boil ) happens when bacteria get trapped under the skin and start to grow. Pus forms inside the abscess as the body responds to the bacteria. An abscess can happen with an insect bite, ingrown hair, blocked oil gland, pimple, cyst, or puncture wound.  In the early stages, your wound may be red and tender. For this stage, you may get antibiotics. If the abscess does not get better with antibiotics, it will need to be drained with a small cut.  Home care  These tips will help you care for your abscess at home:    Soak the wound in hot water or apply hot packs (small towel soaked in hot water) to the area for 20 minutes at a time. Do this 3 to 4 times a day.    Do not cut, squeeze, or pop the boil yourself.    Apply antibiotic cream or ointment to the skin 3 to 4 times a day, unless something else was prescribed. Some ointments include an antibiotic plus a pain reliever.    If your doctor prescribed  antibiotics, do not stop taking them until you have finished the medicine or the doctor tells you to stop.    You may use an over-the-counter pain medicine to control pain, unless another pain medicine was prescribed. If you have chronic liver or kidney disease or ever had a stomach ulcer or gastrointestinal bleeding, talk with your doctor before using these any of these.  Follow-up care  Follow up with your healthcare provider, or as advised. Check your wound each day for the signs of worsening infection listed below.  When to seek medical advice  Get prompt medical attention if any of these occur:    An increase in redness or swelling    Red streaks in the skin leading away from the abscess    An increase in local pain or swelling    Fever of 100.4 F (38 C) or higher, or as directed by your healthcare provider    Pus or fluid coming from the abscess    Boil returns after getting better  Date Last Reviewed: 9/1/2016 2000-2018 [employer]. [address] [place] [address], [place] [address]. All rights reserved. This information is not intended as a substitute for professional medical care. Always follow your healthcare professional's instructions.               ARMIN [facility]

## 2018-12-26 NOTE — PATIENT INSTRUCTIONS
Patient Education     Abscess (Antibiotic Treatment Only)  An abscess (sometimes called a  boil ) happens when bacteria get trapped under the skin and start to grow. Pus forms inside the abscess as the body responds to the bacteria. An abscess can happen with an insect bite, ingrown hair, blocked oil gland, pimple, cyst, or puncture wound.  In the early stages, your wound may be red and tender. For this stage, you may get antibiotics. If the abscess does not get better with antibiotics, it will need to be drained with a small cut.  Home care  These tips will help you care for your abscess at home:    Soak the wound in hot water or apply hot packs (small towel soaked in hot water) to the area for 20 minutes at a time. Do this 3 to 4 times a day.    Do not cut, squeeze, or pop the boil yourself.    Apply antibiotic cream or ointment to the skin 3 to 4 times a day, unless something else was prescribed. Some ointments include an antibiotic plus a pain reliever.    If your doctor prescribed antibiotics, do not stop taking them until you have finished the medicine or the doctor tells you to stop.    You may use an over-the-counter pain medicine to control pain, unless another pain medicine was prescribed. If you have chronic liver or kidney disease or ever had a stomach ulcer or gastrointestinal bleeding, talk with your doctor before using these any of these.  Follow-up care  Follow up with your healthcare provider, or as advised. Check your wound each day for the signs of worsening infection listed below.  When to seek medical advice  Get prompt medical attention if any of these occur:    An increase in redness or swelling    Red streaks in the skin leading away from the abscess    An increase in local pain or swelling    Fever of 100.4 F (38 C) or higher, or as directed by your healthcare provider    Pus or fluid coming from the abscess    Boil returns after getting better  Date Last Reviewed: 9/1/2016 2000-2018  [employer]. [address] [place] [address], [place] [address]. All rights reserved. This information is not intended as a substitute for professional medical care. Always follow your healthcare professional's instructions.

## 2019-01-04 ENCOUNTER — OFFICE VISIT (OUTPATIENT)
Dept: FAMILY MEDICINE | Facility: CLINIC | Age: 49
End: 2019-01-04
Payer: COMMERCIAL

## 2019-01-04 VITALS
HEART RATE: 80 BPM | BODY MASS INDEX: 35.62 KG/M2 | TEMPERATURE: 97.5 F | DIASTOLIC BLOOD PRESSURE: 98 MMHG | HEIGHT: 72 IN | WEIGHT: 263 LBS | SYSTOLIC BLOOD PRESSURE: 156 MMHG

## 2019-01-04 DIAGNOSIS — K04.7 DENTAL ABSCESS: ICD-10-CM

## 2019-01-04 DIAGNOSIS — I10 ESSENTIAL HYPERTENSION: Primary | ICD-10-CM

## 2019-01-04 PROCEDURE — 99213 OFFICE O/P EST LOW 20 MIN: CPT | Performed by: FAMILY MEDICINE

## 2019-01-04 RX ORDER — ATENOLOL 25 MG/1
25 TABLET ORAL DAILY
Qty: 90 TABLET | Refills: 3 | Status: SHIPPED | OUTPATIENT
Start: 2019-01-04 | End: 2020-06-29

## 2019-01-04 RX ORDER — HYDROCODONE BITARTRATE AND ACETAMINOPHEN 5; 325 MG/1; MG/1
1 TABLET ORAL EVERY 6 HOURS PRN
Qty: 18 TABLET | Refills: 0 | Status: SHIPPED | OUTPATIENT
Start: 2019-01-04 | End: 2019-01-07

## 2019-01-04 ASSESSMENT — MIFFLIN-ST. JEOR: SCORE: 2090.96

## 2019-01-04 NOTE — PROGRESS NOTES
SUBJECTIVE:                                                    Karan Josue is a 48 year old male who presents to clinic today for the following health issues:    Chief Complaint   Patient presents with     Dental Problem     Abscess tooth pain, can't get into the dentist till 1/12/19, still painful, waking him up a night.  Taking ibuprofen/tylenol daily recently.      Results     discuss recent lab results and avoiding ibuprofen.     BP Readings from Last 6 Encounters:   01/04/19 (!) 156/98   12/26/18 (!) 148/92   12/21/18 150/90   10/11/18 142/90   09/26/18 150/90   03/02/17 127/87       Problem list and histories reviewed & adjusted, as indicated.  Additional history: has been on an increased dose of the lisinopril for about 1 week   Tooth abscess for the last few weeks dentist is ill and he has been taking 16-20 ibuprofen  He did have some microalbuminuria so he was to cut back o nthe nsaids.   Just started the trulicity he has a bit of nausea from this.     Patient Active Problem List   Diagnosis     Family history of prostate cancer     Adjustment disorder with mixed anxiety and depressed mood     Bipolar I disorder (H)     Diabetes mellitus, type 2 (H)     Obesity (BMI 35.0-39.9) with comorbidity (H)     Unstable angina (H)     Benign essential hypertension     Past Surgical History:   Procedure Laterality Date     NO HISTORY OF SURGERY         Social History     Tobacco Use     Smoking status: Never Smoker     Smokeless tobacco: Never Used   Substance Use Topics     Alcohol use: Yes     Comment: occasional     Family History   Problem Relation Age of Onset     C.A.D. Father 58        nonfatal MI x 2     Prostate Cancer Father 59     Diabetes Father      Prostate Cancer Maternal Grandfather 69     Prostate Cancer Maternal Uncle 48           ROS:  Constitutional, HEENT, cardiovascular, pulmonary, gi and gu systems are negative, except as otherwise noted.    OBJECTIVE:                                                     BP (!) 156/98   Pulse 80   Temp 97.5  F (36.4  C) (Tympanic)   Ht 1.829 m (6')   Wt 119.3 kg (263 lb)   BMI 35.67 kg/m   Body mass index is 35.67 kg/m .   GENERAL APPEARANCE: healthy, alert and no distress  HENT: nose and mouth without ulcers or lesions and tender upper right maxillary teeth        ASSESSMENT/PLAN:                                                      1. Essential hypertension  Will add some atenolol   - atenolol (TENORMIN) 25 MG tablet; Take 1 tablet (25 mg) by mouth daily  Dispense: 90 tablet; Refill: 3    2. Dental abscess  Pain management     - HYDROcodone-acetaminophen (NORCO) 5-325 MG tablet; Take 1 tablet by mouth every 6 hours as needed for pain  Dispense: 18 tablet; Refill: 0     reports that  has never smoked. he has never used smokeless tobacco.          Kyung Avina M.D.  [facility]

## 2019-01-23 ENCOUNTER — TRANSFERRED RECORDS (OUTPATIENT)
Dept: HEALTH INFORMATION MANAGEMENT | Facility: CLINIC | Age: 49
End: 2019-01-23

## 2019-02-04 ENCOUNTER — OFFICE VISIT (OUTPATIENT)
Dept: FAMILY MEDICINE | Facility: CLINIC | Age: 49
End: 2019-02-04
Payer: COMMERCIAL

## 2019-02-04 VITALS
HEART RATE: 65 BPM | DIASTOLIC BLOOD PRESSURE: 93 MMHG | BODY MASS INDEX: 36.3 KG/M2 | SYSTOLIC BLOOD PRESSURE: 147 MMHG | WEIGHT: 268 LBS | HEIGHT: 72 IN

## 2019-02-04 DIAGNOSIS — I10 BENIGN ESSENTIAL HYPERTENSION: ICD-10-CM

## 2019-02-04 DIAGNOSIS — E11.65 TYPE 2 DIABETES MELLITUS WITH HYPERGLYCEMIA, WITHOUT LONG-TERM CURRENT USE OF INSULIN (H): Primary | ICD-10-CM

## 2019-02-04 PROCEDURE — 99214 OFFICE O/P EST MOD 30 MIN: CPT | Performed by: PHYSICIAN ASSISTANT

## 2019-02-04 ASSESSMENT — MIFFLIN-ST. JEOR: SCORE: 2113.64

## 2019-02-04 ASSESSMENT — PAIN SCALES - GENERAL: PAINLEVEL: NO PAIN (0)

## 2019-02-04 NOTE — PATIENT INSTRUCTIONS
Restart blood pressure medications  Follow up in 2-3 weeks for blood pressure check (nurse visit or pharmacy visit) and lab check (basic metabolic panel (electrolytes, glucose, kidney function))  HbA1C (3 month check of blood sugars) is due March 21  Glad things are doing better!

## 2019-02-04 NOTE — LETTER
[facility]  [address] [facility] [address]  [phone]        February 10, 2020    Karan Josue  [address] [facility] [address]              Dear Karan Josue    This is to remind you that your lab is due.    You may call our office at [phone] to schedule an appointment.    Please disregard this notice if you have already had your labs drawn or made an appointment.        Sincerely,        Jimena Grider MD

## 2019-02-04 NOTE — PROGRESS NOTES
SUBJECTIVE:                                                    Karan Josue is a 48 year old male who presents to clinic today for the following health issues:    Diabetes Follow-up      Patient is checking blood sugars: Twice a day, running in the 140's (130s-140s) - in December states 185-220    Diabetic concerns: None     Symptoms of hypoglycemia (low blood sugar): none     Paresthesias (numbness or burning in feet) or sores: No     Date of last diabetic eye exam: Last week     BP Readings from Last 2 Encounters:   02/04/19 (!) 147/93   01/04/19 (!) 156/98     Hemoglobin A1C (%)   Date Value   12/21/2018 9.7 (H)   09/26/2018 10.8 (H)     LDL Cholesterol Calculated (mg/dL)   Date Value   09/26/2018 158 (H)       Diabetes Management Resources  Hypertension Follow-up      Outpatient blood pressures are being checked sometimes.    Low Salt Diet: no added salt      Amount of exercise or physical activity: Every day     Problems taking medications regularly: No    Medication side effects: none    Diet: low salt    1/4/19 added atenolol 25 mg  Started trulicity in December  Stopped metformin about a 1 week in to taking the trulicity due to nausea. stopped by patient when he started trulicity  He thinks the trulicity works well  Not as tired, legs don't hurt as much. He denies any SOB. 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  Additional history: none    Patient Active Problem List   Diagnosis     Family history of prostate cancer     Adjustment disorder with mixed anxiety and depressed mood     Bipolar I disorder (H)     Diabetes mellitus, type 2 (H)      Obesity (BMI 35.0-39.9) with comorbidity (H)     Unstable angina (H)     Benign essential hypertension     Past Surgical History:   Procedure Laterality Date     NO HISTORY OF SURGERY         Social History     Tobacco Use     Smoking status: Never Smoker     Smokeless tobacco: Never Used   Substance Use Topics     Alcohol use: Yes     Comment: occasional     Family History   Problem Relation Age of Onset     C.A.D. Father 58        nonfatal MI x 2     Prostate Cancer Father 59     Diabetes Father      Prostate Cancer Maternal Grandfather 69     Prostate Cancer Maternal Uncle 48         Labs reviewed in EPIC    ROS:  Other than noted above, general, HEENT, respiratory, cardiac, MS, and gastrointestinal systems are negative.     OBJECTIVE:                                                    BP (!) 147/93   Pulse 65   Ht 1.829 m (6')   Wt 121.6 kg (268 lb)   BMI 36.35 kg/m   Body mass index is 36.35 kg/m .   GENERAL: healthy, alert, well nourished, well hydrated, no distress  RESP: lungs clear to auscultation - no rales, no rhonchi, no wheezes  CV: regular rates and rhythm, normal S1 S2, no S3 or S4 and no murmur, no click or rub -       ASSESSMENT/PLAN:                                                      ASSESSMENT/PLAN:      ICD-10-CM    1. Type 2 diabetes mellitus with hyperglycemia, without long-term current use of insulin (H) E11.65 **A1C FUTURE 1yr   2. Benign essential hypertension I10      Overall improved. He does not want to do the A1C today, has not been 3 months. He is just taking trulicity, has been controlling blood sugars and he feels a lot better (he stopped metformin due to side effects). Will give trulicity a little more time before any dose increase.  His blood pressure has been controlled, however he has not taken medications for 1 week but he is tolerating well.   He overall feels much better than the last visit.  He plans to bring back FIT test    Patient Instructions   Restart blood pressure  medications  Follow up in 2-3 weeks for blood pressure check (nurse visit or pharmacy visit) and lab check (basic metabolic panel (electrolytes, glucose, kidney function))  HbA1C (3 month check of blood sugars) is due March 21  Glad things are doing better!    Jimena Grider PA-C   [facility]

## 2019-02-18 ENCOUNTER — OFFICE VISIT (OUTPATIENT)
Dept: FAMILY MEDICINE | Facility: CLINIC | Age: 49
End: 2019-02-18
Payer: COMMERCIAL

## 2019-02-18 VITALS
BODY MASS INDEX: 35.57 KG/M2 | OXYGEN SATURATION: 98 % | RESPIRATION RATE: 13 BRPM | WEIGHT: 262.6 LBS | TEMPERATURE: 97.5 F | SYSTOLIC BLOOD PRESSURE: 134 MMHG | HEIGHT: 72 IN | HEART RATE: 75 BPM | DIASTOLIC BLOOD PRESSURE: 88 MMHG

## 2019-02-18 DIAGNOSIS — I10 BENIGN ESSENTIAL HYPERTENSION: ICD-10-CM

## 2019-02-18 DIAGNOSIS — K04.7 TOOTH INFECTION: Primary | ICD-10-CM

## 2019-02-18 LAB
ANION GAP SERPL CALCULATED.3IONS-SCNC: 5 MMOL/L (ref 3–14)
BUN SERPL-MCNC: 11 MG/DL (ref 7–30)
CALCIUM SERPL-MCNC: 8.9 MG/DL (ref 8.5–10.1)
CHLORIDE SERPL-SCNC: 103 MMOL/L (ref 94–109)
CO2 SERPL-SCNC: 26 MMOL/L (ref 20–32)
CREAT SERPL-MCNC: 0.72 MG/DL (ref 0.66–1.25)
GFR SERPL CREATININE-BSD FRML MDRD: >90 ML/MIN/{1.73_M2}
GLUCOSE SERPL-MCNC: 125 MG/DL (ref 70–99)
POTASSIUM SERPL-SCNC: 4 MMOL/L (ref 3.4–5.3)
SODIUM SERPL-SCNC: 134 MMOL/L (ref 133–144)

## 2019-02-18 PROCEDURE — 99214 OFFICE O/P EST MOD 30 MIN: CPT | Performed by: PHYSICIAN ASSISTANT

## 2019-02-18 PROCEDURE — 36415 COLL VENOUS BLD VENIPUNCTURE: CPT | Performed by: PHYSICIAN ASSISTANT

## 2019-02-18 PROCEDURE — 80048 BASIC METABOLIC PNL TOTAL CA: CPT | Performed by: PHYSICIAN ASSISTANT

## 2019-02-18 RX ORDER — AMOXICILLIN 875 MG
875 TABLET ORAL 2 TIMES DAILY
Qty: 20 TABLET | Refills: 0 | Status: SHIPPED | OUTPATIENT
Start: 2019-02-18 | End: 2019-02-28

## 2019-02-18 RX ORDER — HYDROCODONE BITARTRATE AND ACETAMINOPHEN 5; 325 MG/1; MG/1
1 TABLET ORAL EVERY 8 HOURS PRN
Qty: 10 TABLET | Refills: 0 | Status: SHIPPED | OUTPATIENT
Start: 2019-02-18 | End: 2019-02-21

## 2019-02-18 ASSESSMENT — MIFFLIN-ST. JEOR: SCORE: 2089.15

## 2019-02-18 NOTE — LETTER
[facility]  [address] [facility] [address]  [phone]        February 28, 2020    Karan Josue  [address] [facility] [address]              Dear Karan Josue    This is to remind you that your lab is due.    You may call our office at [phone] to schedule an appointment.    Please disregard this notice if you have already had your labs drawn or made an appointment.        Sincerely,        Jimena Grider MD

## 2019-02-18 NOTE — PATIENT INSTRUCTIONS
Increase atenolol to 50 mg daily  Continue lisinopril-HCTZ    Use the pain medication at night as needed  Call the dentist. Can start amoxicillin at their discretion or they may prescribe different antibiotics

## 2019-02-18 NOTE — PROGRESS NOTES
SUBJECTIVE:   Karan Josue is a 48 year old male who presents to clinic today for the following health issues:    Chief Complaint   Patient presents with     Dental Pain     Had 2 teeth pulled 2/11/2019 was given antibiotics for 6 days and on Saturday 2/16/2019 he started having pain and pain radiating into is ear. States he has not been able to sleep well due to the pain       Pulled two teeth on right side  Were going to pull teeth on left side  Lasted 5 hours. He had had root canals in the past so they had to keep picking out pieces  [facility] [address] [facility]  Then went to [facility] Thursday. They told him that the first place shouldn't have done it like that. Still has two roots, they removed those. Dissolvable stitches. Blood pressure got really high at this visit.  He was given zpak antibiotics, he is finished    At home blood pressure has been 120s/85  Blood sugars 140s      Problem list and histories reviewed & adjusted, as indicated.  Additional history: as documented    Patient Active Problem List   Diagnosis     Family history of prostate cancer     Adjustment disorder with mixed anxiety and depressed mood     Bipolar I disorder (H)     Diabetes mellitus, type 2 (H)     Obesity (BMI 35.0-39.9) with comorbidity (H)     Unstable angina (H)     Benign essential hypertension     Past Surgical History:   Procedure Laterality Date     NO HISTORY OF SURGERY         Social History     Tobacco Use     Smoking status: Never Smoker     Smokeless tobacco: Never Used   Substance Use Topics     Alcohol use: Yes     Comment: occasional     Family History   Problem Relation Age of Onset     C.A.D. Father 58        nonfatal MI x 2     Prostate Cancer Father 59     Diabetes Father      Prostate Cancer Maternal Grandfather 69     Prostate Cancer Maternal Uncle 48           Reviewed and updated as needed this visit by clinical staff  Tobacco  Allergies  Meds  Med Hx  Surg Hx  Fam Hx  Soc Hx       Reviewed and updated as needed this visit by Provider         ROS:  Other than noted above, general, HEENT, respiratory, cardiac, MS, and gastrointestinal systems are negative.     OBJECTIVE:     /88   Pulse 75   Temp 97.5  F (36.4  C) (Tympanic)   Resp 13   Ht 1.829 m (6')   Wt 119.1 kg (262 lb 9.6 oz)   SpO2 98%   BMI 35.61 kg/m    Body mass index is 35.61 kg/m .  GENERAL: healthy, alert and no distress  HENT: ear canals and TM's normal, nose and mouth without ulcers or lesions  POSITIVE Right top molar removed  Right bottom 2 molars removed, stitches seen, inflammation/mild swelling/erythema on gums surrounding the sockets. No abscess seen    NECK: no adenopathy, no asymmetry, masses, or scars and thyroid normal to palpation  RESP: lungs clear to auscultation - no rales, rhonchi or wheezes  CV: regular rate and rhythm, normal S1 S2, no S3 or S4, no murmur, click or rub, no peripheral edema and peripheral pulses strong  ABDOMEN: soft, nontender, no hepatosplenomegaly, no masses and bowel sounds normal  MS: no gross musculoskeletal defects noted, no edema    ASSESSMENT/PLAN:     ASSESSMENT/PLAN:      ICD-10-CM    1. Tooth infection K04.7 HYDROcodone-acetaminophen (NORCO) 5-325 MG tablet     amoxicillin (AMOXIL) 875 MG tablet   2. Benign essential hypertension I10 **Basic metabolic panel FUTURE 1yr     Inflammation vs infection - needs follow up with dentist  Will increase atenolol    Patient Instructions   Increase atenolol to 50 mg daily  Continue lisinopril-HCTZ    Use the pain medication at night as needed  Call the dentist. Can start amoxicillin at their discretion or they may prescribe different antibiotics     Jimena Grider PA-C  [facility]

## 2019-04-11 ENCOUNTER — TELEPHONE (OUTPATIENT)
Dept: FAMILY MEDICINE | Facility: CLINIC | Age: 49
End: 2019-04-11

## 2019-08-14 ENCOUNTER — TELEPHONE (OUTPATIENT)
Dept: FAMILY MEDICINE | Facility: CLINIC | Age: 49
End: 2019-08-14

## 2019-08-14 NOTE — TELEPHONE ENCOUNTER
Panel Management Review      Patient has the following on his problem list:     Depression / Dysthymia review    Measure:  Needs PHQ-9 score of 4 or less during index window.  Administer PHQ-9 and if score is 5 or more, send encounter to provider for next steps.    5 - 7 month window range: n/a    PHQ-9 SCORE 3/2/2017 9/26/2018   PHQ-9 Total Score 17 3       If PHQ-9 recheck is 5 or more, route to provider for next steps.    Patient is due for:  PHQ9 and IRENE    Diabetes    ASA: Passed    Last A1C  Lab Results   Component Value Date    A1C 9.7 12/21/2018    A1C 10.8 09/26/2018     A1C tested: MONITOR    Last LDL:    Lab Results   Component Value Date    CHOL 213 09/26/2018     Lab Results   Component Value Date    HDL 34 09/26/2018     Lab Results   Component Value Date     09/26/2018     Lab Results   Component Value Date    TRIG 106 09/26/2018     No results found for: CHOLHDLRATIO  Lab Results   Component Value Date    NHDL 179 09/26/2018       Is the patient on a Statin? YES             Is the patient on Aspirin? YES    Medications     HMG CoA Reductase Inhibitors     atorvastatin (LIPITOR) 40 MG tablet       Salicylates     aspirin 81 MG tablet             Last three blood pressure readings:  BP Readings from Last 3 Encounters:   02/18/19 134/88   02/04/19 (!) 147/93   01/04/19 (!) 156/98       Date of last diabetes office visit: 02/2019     Tobacco History:     History   Smoking Status     Never Smoker   Smokeless Tobacco     Never Used           Composite cancer screening  Chart review shows that this patient is due/due soon for the following Fecal Colorectal (FIT)  Summary:    Patient is due/failing the following:  A1C, and FIT    Action needed:   Patient needs non-fasting lab only appointment    Type of outreach:    Sent [employer] message. and Sent letter.    Questions for provider review:    None                                                                                                                                     Sallie Arcos CMA       Chart routed to self .

## 2019-08-14 NOTE — LETTER
August 14, 2019      Karan Josue  [address] [place] [id number]        Dear Karan,    In order to ensure we are providing the best quality care, Tianna and [facility] have reviewed your chart and see that you are due for a A1c diabetes lab test and a FIT (fecal immunochemical testing) for colon cancer screening.     Please call [phone] to set up a Lab only appointment. You can also stop at any [facility] location for the lab visit.     FIT (fecal immunochemical testing(take home stool sample kit)) is a colon screening option.  This is a test to check for blood in the stool, which can be performed in the comfort of your own home. When you have completed the test, you simply mail it in the pre-addressed envelope.  It does not replace the colonoscopy for colorectal cancer screening, but it can detect hidden bleeding in the lower colon.  It does need to be repeated every year and if a positive result is obtained, you would be referred for a colonoscopy. The FIT test is really easy to do and does not require any  diet or medication restrictions and involves only one collection sample.  Please return the sample in the kit provided.     We greatly appreciate the opportunity to serve you. Thank you for trusting us with your health care.    Sincerely,    KWADWO Baltazar, CMA

## 2019-08-28 NOTE — TELEPHONE ENCOUNTER
My chart not read, tried calling went right to voice mail and voice mail box is not set up.   Will try again.   Sallie Arcos, CMA

## 2019-09-30 ENCOUNTER — HEALTH MAINTENANCE LETTER (OUTPATIENT)
Age: 49
End: 2019-09-30

## 2019-10-16 ENCOUNTER — TELEPHONE (OUTPATIENT)
Dept: FAMILY MEDICINE | Facility: CLINIC | Age: 49
End: 2019-10-16

## 2019-10-16 NOTE — TELEPHONE ENCOUNTER
Panel Management Review      Patient has the following on his problem list:     Depression / Dysthymia review    Measure:  Needs PHQ-9 score of 4 or less during index window.  Administer PHQ-9 and if score is 5 or more, send encounter to provider for next steps.    5 - 7 month window range:     PHQ-9 SCORE 3/2/2017 9/26/2018   PHQ-9 Total Score 17 3       If PHQ-9 recheck is 5 or more, route to provider for next steps.    Patient is due for:  PHQ9    Diabetes    ASA: Failed    Last A1C  Lab Results   Component Value Date    A1C 9.7 12/21/2018    A1C 10.8 09/26/2018     A1C tested: FAILED    Last LDL:    Lab Results   Component Value Date    CHOL 213 09/26/2018     Lab Results   Component Value Date    HDL 34 09/26/2018     Lab Results   Component Value Date     09/26/2018     Lab Results   Component Value Date    TRIG 106 09/26/2018     No results found for: CHOLHDLRATIO  Lab Results   Component Value Date    NHDL 179 09/26/2018       Is the patient on a Statin? YES             Is the patient on Aspirin? YES    Medications     HMG CoA Reductase Inhibitors     atorvastatin (LIPITOR) 40 MG tablet       Salicylates     aspirin 81 MG tablet             Last three blood pressure readings:  BP Readings from Last 3 Encounters:   02/18/19 134/88   02/04/19 (!) 147/93   01/04/19 (!) 156/98       Date of last diabetes office visit: 2/4/2019     Tobacco History:     History   Smoking Status     Never Smoker   Smokeless Tobacco     Never Used         Hypertension   Last three blood pressure readings:  BP Readings from Last 3 Encounters:   02/18/19 134/88   02/04/19 (!) 147/93   01/04/19 (!) 156/98     Blood pressure: Passed    HTN Guidelines:  Less than 140/90      Composite cancer screening  Chart review shows that this patient is due/due soon for the following Fecal Colorectal (FIT)  Summary:    Patient is due/failing the following:   A1C, FOLLOW UP, FIT and PHQ9    Action needed:   Patient needs office visit for  Diabeties Management.    Type of outreach:    Phone, left message for patient to call back.  and Sent letter. Patient phone goes right to voicemail and mailbox full not able to leave message    Questions for provider review:    None                                                                                                                                    [facility]     Chart routed to Care Team .

## 2019-10-28 ENCOUNTER — HEALTH MAINTENANCE LETTER (OUTPATIENT)
Age: 49
End: 2019-10-28

## 2020-03-01 ENCOUNTER — TRANSFERRED RECORDS (OUTPATIENT)
Dept: HEALTH INFORMATION MANAGEMENT | Facility: CLINIC | Age: 50
End: 2020-03-01

## 2020-03-01 LAB — RETINOPATHY: NORMAL

## 2020-06-29 ENCOUNTER — TELEPHONE (OUTPATIENT)
Dept: FAMILY MEDICINE | Facility: CLINIC | Age: 50
End: 2020-06-29

## 2020-06-29 ENCOUNTER — VIRTUAL VISIT (OUTPATIENT)
Dept: FAMILY MEDICINE | Facility: CLINIC | Age: 50
End: 2020-06-29
Payer: MEDICAID

## 2020-06-29 DIAGNOSIS — D48.5 NEOPLASM OF UNCERTAIN BEHAVIOR OF SKIN: ICD-10-CM

## 2020-06-29 DIAGNOSIS — Z12.11 SPECIAL SCREENING FOR MALIGNANT NEOPLASMS, COLON: ICD-10-CM

## 2020-06-29 DIAGNOSIS — E11.65 TYPE 2 DIABETES MELLITUS WITH HYPERGLYCEMIA, WITHOUT LONG-TERM CURRENT USE OF INSULIN (H): ICD-10-CM

## 2020-06-29 DIAGNOSIS — E66.01 MORBID OBESITY (H): ICD-10-CM

## 2020-06-29 DIAGNOSIS — E11.9 TYPE 2 DIABETES MELLITUS WITHOUT COMPLICATION, WITHOUT LONG-TERM CURRENT USE OF INSULIN (H): Primary | ICD-10-CM

## 2020-06-29 DIAGNOSIS — Z13.220 SCREENING FOR HYPERLIPIDEMIA: ICD-10-CM

## 2020-06-29 DIAGNOSIS — E11.65 TYPE 2 DIABETES MELLITUS WITH HYPERGLYCEMIA, WITHOUT LONG-TERM CURRENT USE OF INSULIN (H): Primary | ICD-10-CM

## 2020-06-29 DIAGNOSIS — I10 BENIGN ESSENTIAL HYPERTENSION: ICD-10-CM

## 2020-06-29 DIAGNOSIS — I20.0 UNSTABLE ANGINA (H): ICD-10-CM

## 2020-06-29 PROCEDURE — 99214 OFFICE O/P EST MOD 30 MIN: CPT | Mod: 95 | Performed by: PHYSICIAN ASSISTANT

## 2020-06-29 RX ORDER — LISINOPRIL 40 MG/1
40 TABLET ORAL DAILY
COMMUNITY
End: 2020-06-29 | Stop reason: ALTCHOICE

## 2020-06-29 RX ORDER — LISINOPRIL AND HYDROCHLOROTHIAZIDE 12.5; 2 MG/1; MG/1
2 TABLET ORAL DAILY
Qty: 180 TABLET | Refills: 3 | Status: SHIPPED | OUTPATIENT
Start: 2020-06-29 | End: 2022-05-11

## 2020-06-29 RX ORDER — DULAGLUTIDE 0.75 MG/.5ML
0.75 INJECTION, SOLUTION SUBCUTANEOUS
Qty: 0.5 ML | Refills: 11 | Status: SHIPPED | OUTPATIENT
Start: 2020-06-29 | End: 2020-09-02

## 2020-06-29 RX ORDER — LANCETS
EACH MISCELLANEOUS
Qty: 200 EACH | Refills: 3 | Status: SHIPPED | OUTPATIENT
Start: 2020-06-29

## 2020-06-29 RX ORDER — ATORVASTATIN CALCIUM 40 MG/1
40 TABLET, FILM COATED ORAL DAILY
Qty: 90 TABLET | Refills: 3 | Status: SHIPPED | OUTPATIENT
Start: 2020-06-29 | End: 2021-03-01

## 2020-06-29 NOTE — PROGRESS NOTES
"Karan Josue is a 50 year old male who is being evaluated via a billable telephone visit.      The patient has been notified of following:     \"This telephone visit will be conducted via a call between you and your physician/provider. We have found that certain health care needs can be provided without the need for a physical exam.  This service lets us provide the care you need with a short phone conversation.  If a prescription is necessary we can send it directly to your pharmacy.  If lab work is needed we can place an order for that and you can then stop by our lab to have the test done at a later time.    Telephone visits are billed at different rates depending on your insurance coverage. During this emergency period, for some insurers they may be billed the same as an in-person visit.  Please reach out to your insurance provider with any questions.    If during the course of the call the physician/provider feels a telephone visit is not appropriate, you will not be charged for this service.\"    Patient has given verbal consent for Telephone visit?  Yes    What phone number would you like to be contacted at? [phone]    How would you like to obtain your AVS? Mail a copy    Subjective     Karan Josue is a 50 year old male who presents via phone visit today for the following health issues:    HPI  Diabetes Follow-up    How often are you checking your blood sugar? Two times daily  Blood sugar testing frequency justification:  Patient modifying lifestyle changes (diet, exercise) with blood sugars  What time of day are you checking your blood sugars (select all that apply)?  Before meals  Have you had any blood sugars above 200?  No  Have you had any blood sugars below 70?  No    What symptoms do you notice when your blood sugar is low?  None    What concerns do you have today about your diabetes? Other: Would like to get back on Trulicity, this was working really well for him, but while he was in [facility] they " would not give him the trulicity so they started metformin 1000 mg 2 times daily and he states that it causes him to have stomach problems     Do you have any of these symptoms? (Select all that apply)  No numbness or tingling in feet.  No redness, sores or blisters on feet.  No complaints of excessive thirst.  No reports of blurry vision.  No significant changes to weight.    Have you had a diabetic eye exam in the last 12 months? Yes- Date of last eye exam: 3 months ago, they did it while he was in longterm and will be setting up another exam in a couple weeks,  Location: Through longterm he had it    *  Would like to have continuous glucose meter that attaches to the arm so he can monitor his readings and foods that make it go higher.  Diarrhea, stomach upset, no heartburn    ** he has a dry patch/spot on back of right arm that doesn't go away. Would like that checked out. Skin colored, raised bumpy, like a scab. If he scratches it, it will bleed. Doubled in size in the last year. Size of pencil eraser.    Last A1C 7.1 - 3 months ago. They checked his other labs too, and they were normal  He has lost 35 lb - eating better, and not great food in longterm  He was in longterm for 1 year, got home last week    He denies CP, any chest symptoms       BP Readings from Last 2 Encounters:   02/18/19 134/88   02/04/19 (!) 147/93     Hemoglobin A1C (%)   Date Value   12/21/2018 9.7 (H)   09/26/2018 10.8 (H)     LDL Cholesterol Calculated (mg/dL)   Date Value   09/26/2018 158 (H)       Hypertension Follow-up      Do you check your blood pressure regularly outside of the clinic? No, will occasionally check at home      Are you following a low salt diet? Yes    Are your blood pressures ever more than 140 on the top number (systolic) OR more   than 90 on the bottom number (diastolic), for example 140/90? No    Average at home 130s/80s  He could not get lisinopril-HCTZ in longterm             Patient Active Problem List   Diagnosis     Family  history of prostate cancer     Adjustment disorder with mixed anxiety and depressed mood     Bipolar I disorder (H)     Type 2 diabetes mellitus with hyperglycemia, without long-term current use of insulin (H)     Obesity (BMI 35.0-39.9) with comorbidity (H)     Unstable angina (H)     Benign essential hypertension     Past Surgical History:   Procedure Laterality Date     NO HISTORY OF SURGERY         Social History     Tobacco Use     Smoking status: Never Smoker     Smokeless tobacco: Never Used   Substance Use Topics     Alcohol use: Yes     Comment: occasional     Family History   Problem Relation Age of Onset     C.A.D. Father 58        nonfatal MI x 2     Prostate Cancer Father 59     Diabetes Father      Prostate Cancer Maternal Grandfather 69     Prostate Cancer Maternal Uncle 48           Reviewed and updated as needed this visit by Provider  Tobacco  Allergies  Meds  Problems  Med Hx  Surg Hx  Fam Hx         Review of Systems   Other than noted above, general, HEENT, respiratory, cardiac, MS, and gastrointestinal systems are negative.        Objective   Reported vitals:  There were no vitals taken for this visit.     PSYCH: Alert and oriented times 3; coherent speech, normal   rate and volume, able to articulate logical thoughts, able   to abstract reason, no tangential thoughts, no hallucinations   or delusions  His affect is normal  RESP: No cough, no audible wheezing, able to talk in full sentences  Remainder of exam unable to be completed due to telephone visits    Diagnostic Test Results:  Labs reviewed in Epic        Assessment/Plan:  ASSESSMENT/PLAN:      ICD-10-CM    1. Type 2 diabetes mellitus without complication, without long-term current use of insulin (H)  E11.9 Albumin Random Urine Quantitative with Creat Ratio     BASIC METABOLIC PANEL     Hemoglobin A1c - FUTURE S+90     Lipid panel reflex to direct LDL Fasting     dulaglutide (TRULICITY) 0.75 MG/0.5ML pen     Continuous Glucose  "Monitor KIT     atorvastatin (LIPITOR) 40 MG tablet     **TSH with free T4 reflex FUTURE anytime   2. Screening for hyperlipidemia  Z13.220    3. Benign essential hypertension  I10 lisinopril-hydrochlorothiazide (ZESTORETIC) 20-12.5 MG tablet   4. Unstable angina (H)  I20.0 atorvastatin (LIPITOR) 40 MG tablet   5. Neoplasm of uncertain behavior of skin  D48.5 DERMATOLOGY REFERRAL   6. Special screening for malignant neoplasms, colon  Z12.11 Fecal colorectal cancer screen (FIT)   7. Morbid obesity (H)  E66.01    8. Type 2 diabetes mellitus with hyperglycemia, without long-term current use of insulin (H)  E11.65        Unstable angina: diagnosed 2018 during hospital visit when he had: \"coronary angiogram showed no significant coronary artery disease though he has insignificant coronary artery disease. \"  He denies further CP or chest symptoms     Per patient diabetes well controlled. Hypertension could be better at goal.    Patient Instructions   Come in for exam/likely biopsy of the skin lesion on your arm  Either see family practice in [place] or dermatology in [place] (see referral)    Fasting labwork    Change blood pressure medication: stop lisinopril 40 mg, start lisinopril-HCTZ 20-12.5 mg take 2 tablets daily    When you are able to start trulicity, if your blood sugars are still controlled can stop metformin    Look into glycemic index - this is not a perfect system, but can show which foods pack more carbs than others to get a sense of this  Carbohydrate counting is another helpful tool  This website has good information for heart healthy eating. These are free video modules.  https://www.[employer].org/nutrition_course/    Return in about 6 months (around 12/29/2020).     reports that he has never smoked. He has never used smokeless tobacco.    Estimated body mass index is 35.61 kg/m  as calculated from the following:    Height as of 2/18/19: 1.829 m (6').    Weight as of 2/18/19: 119.1 kg (262 lb 9.6 " oz).  Weight management plan: Discussed healthy diet and exercise guidelines      Phone call duration:  18 minutes      Jimena Grider PA-C

## 2020-06-29 NOTE — PATIENT INSTRUCTIONS
Come in for exam/likely biopsy of the skin lesion on your arm  Either see family practice in [place] or dermatology in [place] (see referral)    Fasting labwork    Change blood pressure medication: stop lisinopril 40 mg, start lisinopril-HCTZ 20-12.5 mg take 2 tablets daily    When you are able to start trulicity, if your blood sugars are still controlled can stop metformin    Look into glycemic index - this is not a perfect system, but can show which foods pack more carbs than others to get a sense of this  Carbohydrate counting is another helpful tool  This website has good information for heart healthy eating. These are free video modules.  https://www.[facility].org/nutrition_course/

## 2020-06-29 NOTE — TELEPHONE ENCOUNTER
We received an order for a continuous glucose monitor kit. If you're looking for testing supplies we will need an order for strips and lancets also to go with it

## 2020-06-30 ENCOUNTER — TELEPHONE (OUTPATIENT)
Dept: FAMILY MEDICINE | Facility: CLINIC | Age: 50
End: 2020-06-30

## 2020-06-30 DIAGNOSIS — E11.65 TYPE 2 DIABETES MELLITUS WITH HYPERGLYCEMIA, WITHOUT LONG-TERM CURRENT USE OF INSULIN (H): Primary | ICD-10-CM

## 2020-06-30 NOTE — TELEPHONE ENCOUNTER
Prior Authorization Retail Medication Request    Medication/Dose: trulicity   ICD code (if different than what is on RX):    Previously Tried and Failed:    Rationale:      Insurance Name:  [place] Medicaid   Insurance ID:  [id number]      Pharmacy Information (if different than what is on RX)  Name:  [facility]  Phone:  [phone]

## 2020-06-30 NOTE — TELEPHONE ENCOUNTER
Central Prior Authorization Team  Phone: [phone]    PA Initiation    Medication: trulicity   Insurance Company: [place] Medicaid ([facility] - Phone [phone] Fax [phone]  Pharmacy Filling the Rx: [facility] - [id number]  Filling Pharmacy Phone: [phone]  Filling Pharmacy Fax:    Start Date: 6/30/2020

## 2020-06-30 NOTE — TELEPHONE ENCOUNTER
PRIOR AUTHORIZATION DENIED    Medication: trulicity- DENIED     Denial Date: 6/30/2020    Denial Rational: Patient must have a history of trial & failure to 2 formulary alternatives or have a contraindication or intolerance to the formulary alternatives:        Appeal Information: If provider would like to appeal please provide a letter of medical necessity.

## 2020-06-30 NOTE — LETTER
[facility]  [address]  [facility] [address]  Phone: [phone]    July 1, 2020        Karan Josue  [address] [place] [facility] [address]          To whom it may concern:    RE: Karan Josue    I have been seeing this patient in clinic since 2018.  He has tried metformin XR two different times and has not tolerated it due to side effects. He has taken trulicity in the past with no side effects. He has history of atherosclerotic CVD: GLP1 agonists are the recommended first choice for monotherapy when metformin is not tolerated. I recommend coverage of trulicity or other GLP1 agonist for this patient.    Please contact me for questions or concerns.      Sincerely,        Jimena Grider PA-C

## 2020-07-01 NOTE — TELEPHONE ENCOUNTER
"Karan reports that he currently takes 1000 mg twice a day of extended release metformin. He says that he has stomach upset and diarrhea with this medication. He said that he also had these side effects with the regular metformin.   He said that he has not tried any other diabetic oral agents.  He said that trulicity injectable has worked well for him.   He said, \"This happened before with trulicity and then somehow it was able to be covered.\"  He is willing to try whatever diabetic medication that Tianna thinks would be appropriate.  Matt Edmondson, RN    "

## 2020-07-01 NOTE — TELEPHONE ENCOUNTER
I will try for an appeal. I wrote a letter,     I have been seeing this patient in clinic since 2018.  He has tried metformin XR two different times and has not tolerated it due to side effects. He has taken trulicity in the past with no side effects. He has history of atherosclerotic CVD: GLP1 agonists are the recommended first choice for monotherapy when metformin is not tolerated. I recommend coverage of trulicity or other GLP1 agonist for this patient.    I will forward this encounter to the prior auth department, and let me know if the patient has any concerns with this.  Jimena Grider PA-C

## 2020-07-01 NOTE — TELEPHONE ENCOUNTER
"Unfortunately his insurance is not approving the trulicity.  He needs to \"fail\" (not have adequate diabetic control) with 2 oral medications and then have tried two other injectables first. I think since he likes the trulicity, the other GLP1 agonist injectables would be fine, but we do need to try the oral medications first.    One question, with the metformin he is taking, is it XR or regular release? We could try extended release to hopefully have fewer GI side effects.     If he is already using this, we could switch to another oral agent to try.  Jimena Grider PA-C   "

## 2020-07-06 NOTE — TELEPHONE ENCOUNTER
Medication Appeal Initiation    We have initiated an appeal for the requested medication:  Medication: trulicity- APPEAL INITIATED   Appeal Start Date:  7/6/2020  Insurance Company: [place] Medicaid ([facility]) - Phone [phone] Fax [phone]  Comments:  Faxed Urgent appeal to insur. Fax# [phone]

## 2020-07-06 NOTE — TELEPHONE ENCOUNTER
MEDICATION APPEAL DENIED    Medication: trulicity- APPEAL DENIED     Denial Date: 7/6/2020    Denial Rational:           Second Level Appeal Information:     Second level appeals will be managed by the clinic staff and provider. Please contact the [employer] Prior Authorization Team if additional information about the denial is needed.

## 2020-07-08 NOTE — TELEPHONE ENCOUNTER
Can you call/find out to clarify - does he need to meet criteria 1 AND 2? Or can we just try option 2 another GLP1 agonist without trying option 1?  Jimena Grider PA-C

## 2020-07-09 RX ORDER — LIRAGLUTIDE 6 MG/ML
1.2 INJECTION SUBCUTANEOUS DAILY
Qty: 6 ML | Refills: 3 | Status: SHIPPED | OUTPATIENT
Start: 2020-07-09 | End: 2020-08-10 | Stop reason: SINTOL

## 2020-07-09 RX ORDER — GLUCOSAMINE HCL/CHONDROITIN SU 500-400 MG
CAPSULE ORAL
Qty: 100 EACH | Refills: 3 | Status: SHIPPED | OUTPATIENT
Start: 2020-07-09

## 2020-07-09 RX ORDER — METHYLPREDNISOLONE SODIUM SUCCINATE 125 MG/2ML
INJECTION, POWDER, FOR SOLUTION INTRAMUSCULAR; INTRAVENOUS
Qty: 100 EACH | Refills: 3 | Status: SHIPPED | OUTPATIENT
Start: 2020-07-09

## 2020-07-09 NOTE — TELEPHONE ENCOUNTER
Tianna does this have to be trulicity? I am going to try sending in victoza and see what happens. Kyung Avina M.D.  '

## 2020-07-09 NOTE — TELEPHONE ENCOUNTER
Yes, both criteria need to be met before Trulicity can be considered for coverage.    MARY CAMILLE

## 2020-07-10 DIAGNOSIS — Z12.11 SPECIAL SCREENING FOR MALIGNANT NEOPLASMS, COLON: ICD-10-CM

## 2020-07-10 PROCEDURE — 82274 ASSAY TEST FOR BLOOD FECAL: CPT | Performed by: PHYSICIAN ASSISTANT

## 2020-07-13 LAB — HEMOCCULT STL QL IA: NEGATIVE

## 2020-08-03 ENCOUNTER — TELEPHONE (OUTPATIENT)
Dept: FAMILY MEDICINE | Facility: CLINIC | Age: 50
End: 2020-08-03

## 2020-08-03 DIAGNOSIS — E11.9 TYPE 2 DIABETES MELLITUS WITHOUT COMPLICATION, WITHOUT LONG-TERM CURRENT USE OF INSULIN (H): ICD-10-CM

## 2020-08-03 NOTE — TELEPHONE ENCOUNTER
Prior Authorization Required on: Trulicity 0.75mg/0.5ml  Insurance: [facility]  Insurance Phone: [phone]  Patient ID: [id number]  Please Contact the Pharmacy with Prior Auth Status (approval/denial).   Thank You!    See Larry  Pharmacy Technician  [facility]  [phone]

## 2020-08-03 NOTE — TELEPHONE ENCOUNTER
Central Prior Authorization Team  Phone: [phone]    PA Initiation    Medication: Trulicity 0.75mg/0.5ml  Insurance Company: [employer] - Phone [phone] Fax [phone]  Pharmacy Filling the Rx: [facility] [place] - [id number]  Filling Pharmacy Phone: [phone]  Filling Pharmacy Fax:    Start Date: 8/3/2020

## 2020-08-05 ENCOUNTER — ALLIED HEALTH/NURSE VISIT (OUTPATIENT)
Dept: FAMILY MEDICINE | Facility: CLINIC | Age: 50
End: 2020-08-05
Payer: COMMERCIAL

## 2020-08-05 ENCOUNTER — TELEPHONE (OUTPATIENT)
Dept: FAMILY MEDICINE | Facility: CLINIC | Age: 50
End: 2020-08-05

## 2020-08-05 VITALS
HEART RATE: 72 BPM | OXYGEN SATURATION: 97 % | RESPIRATION RATE: 18 BRPM | DIASTOLIC BLOOD PRESSURE: 80 MMHG | SYSTOLIC BLOOD PRESSURE: 142 MMHG

## 2020-08-05 DIAGNOSIS — E78.5 HYPERLIPIDEMIA LDL GOAL <130: ICD-10-CM

## 2020-08-05 DIAGNOSIS — I10 BENIGN ESSENTIAL HYPERTENSION: Primary | ICD-10-CM

## 2020-08-05 DIAGNOSIS — E11.9 TYPE 2 DIABETES MELLITUS WITHOUT COMPLICATION, WITHOUT LONG-TERM CURRENT USE OF INSULIN (H): ICD-10-CM

## 2020-08-05 DIAGNOSIS — E11.65 TYPE 2 DIABETES MELLITUS WITH HYPERGLYCEMIA, WITHOUT LONG-TERM CURRENT USE OF INSULIN (H): ICD-10-CM

## 2020-08-05 LAB
ANION GAP SERPL CALCULATED.3IONS-SCNC: 6 MMOL/L (ref 3–14)
BUN SERPL-MCNC: 15 MG/DL (ref 7–30)
CALCIUM SERPL-MCNC: 9.5 MG/DL (ref 8.5–10.1)
CHLORIDE SERPL-SCNC: 107 MMOL/L (ref 94–109)
CHOLEST SERPL-MCNC: 226 MG/DL
CO2 SERPL-SCNC: 25 MMOL/L (ref 20–32)
CREAT SERPL-MCNC: 0.67 MG/DL (ref 0.66–1.25)
CREAT UR-MCNC: 100 MG/DL
GFR SERPL CREATININE-BSD FRML MDRD: >90 ML/MIN/{1.73_M2}
GLUCOSE SERPL-MCNC: 113 MG/DL (ref 70–99)
HBA1C MFR BLD: 7.6 % (ref 0–5.6)
HDLC SERPL-MCNC: 47 MG/DL
LDLC SERPL CALC-MCNC: 148 MG/DL
MICROALBUMIN UR-MCNC: 22 MG/L
MICROALBUMIN/CREAT UR: 22.47 MG/G CR (ref 0–17)
NONHDLC SERPL-MCNC: 179 MG/DL
POTASSIUM SERPL-SCNC: 3.8 MMOL/L (ref 3.4–5.3)
SODIUM SERPL-SCNC: 138 MMOL/L (ref 133–144)
TRIGL SERPL-MCNC: 157 MG/DL
TSH SERPL DL<=0.005 MIU/L-ACNC: 1.15 MU/L (ref 0.4–4)

## 2020-08-05 PROCEDURE — 83036 HEMOGLOBIN GLYCOSYLATED A1C: CPT | Performed by: PHYSICIAN ASSISTANT

## 2020-08-05 PROCEDURE — 80048 BASIC METABOLIC PNL TOTAL CA: CPT | Performed by: PHYSICIAN ASSISTANT

## 2020-08-05 PROCEDURE — 99207 ZZC NO CHARGE NURSE ONLY: CPT

## 2020-08-05 PROCEDURE — 80061 LIPID PANEL: CPT | Performed by: PHYSICIAN ASSISTANT

## 2020-08-05 PROCEDURE — 84443 ASSAY THYROID STIM HORMONE: CPT | Performed by: PHYSICIAN ASSISTANT

## 2020-08-05 PROCEDURE — 82043 UR ALBUMIN QUANTITATIVE: CPT | Performed by: PHYSICIAN ASSISTANT

## 2020-08-05 PROCEDURE — 36415 COLL VENOUS BLD VENIPUNCTURE: CPT | Performed by: PHYSICIAN ASSISTANT

## 2020-08-05 RX ORDER — AMLODIPINE BESYLATE 5 MG/1
5 TABLET ORAL DAILY
Qty: 30 TABLET | Refills: 1 | Status: SHIPPED | OUTPATIENT
Start: 2020-08-05 | End: 2021-03-01

## 2020-08-05 RX ORDER — ATORVASTATIN CALCIUM 80 MG/1
80 TABLET, FILM COATED ORAL DAILY
Qty: 90 TABLET | Refills: 3 | Status: SHIPPED | OUTPATIENT
Start: 2020-08-05 | End: 2022-05-11 | Stop reason: SINTOL

## 2020-08-05 NOTE — TELEPHONE ENCOUNTER
Let's try to more consistently get blood pressure <130/80.  I sent in norvasc, a new blood pressure medication. Continue to monitor blood pressure and let us know how the readings are, or if any side effects. Sometimes at higher doses can cause swelling in legs; he should be okay at this dose but if he has an issue let me know.  Jimena Grider PA-C

## 2020-08-05 NOTE — TELEPHONE ENCOUNTER
How are his blood pressure readings at home? They had been better controlled at home.  We can continue to monitor blood pressure readings the next couple weeks or can add on a medication.  Jimena Grider PA-C

## 2020-08-05 NOTE — TELEPHONE ENCOUNTER
"Karan reports that his BP's at home have been running 135-145/80-85. He said that he is willing to have another blood pressure med added or he can continue to monitor at home; \"whatever Tianna thinks is best\".  Matt Edmondson RN    "

## 2020-08-05 NOTE — TELEPHONE ENCOUNTER
Jimena,    Patient comes into the clinic today for a BP CK.  Medications and allergies are gone over with the patient and are up to date.  Patient had labs done today.  Karla ZAMAN RN    Vital Signs 8/5/2020 8/5/2020   Systolic 142 142   Diastolic 80 80   Pulse 72 72   Temperature     Respirations  18   Weight (LB)     Height     BMI (Calculated)     Pain     O2 97 97

## 2020-08-06 NOTE — TELEPHONE ENCOUNTER
Call placed to patient.  Relayed result notes and message from ROMARIO Grider documented yesterday.  Reviewed amlodipine, atorvastatin.  Patient verbalizes understanding and agrees.  Aarti Espinoza RN

## 2020-08-10 ENCOUNTER — MYC MEDICAL ADVICE (OUTPATIENT)
Dept: FAMILY MEDICINE | Facility: CLINIC | Age: 50
End: 2020-08-10

## 2020-08-10 DIAGNOSIS — E11.65 TYPE 2 DIABETES MELLITUS WITH HYPERGLYCEMIA, WITHOUT LONG-TERM CURRENT USE OF INSULIN (H): Primary | ICD-10-CM

## 2020-08-10 NOTE — TELEPHONE ENCOUNTER
Switching to bydureon 8/10/20.  Side effects on victoza: GI upset, heartburn.  Jimena Grider PA-C

## 2020-08-14 ENCOUNTER — TRANSFERRED RECORDS (OUTPATIENT)
Dept: HEALTH INFORMATION MANAGEMENT | Facility: CLINIC | Age: 50
End: 2020-08-14

## 2020-08-21 ENCOUNTER — MYC MEDICAL ADVICE (OUTPATIENT)
Dept: FAMILY MEDICINE | Facility: CLINIC | Age: 50
End: 2020-08-21

## 2020-08-21 ENCOUNTER — TRANSFERRED RECORDS (OUTPATIENT)
Dept: HEALTH INFORMATION MANAGEMENT | Facility: CLINIC | Age: 50
End: 2020-08-21

## 2020-08-21 DIAGNOSIS — E11.9 TYPE 2 DIABETES MELLITUS WITHOUT COMPLICATION, WITHOUT LONG-TERM CURRENT USE OF INSULIN (H): ICD-10-CM

## 2020-08-21 LAB — RETINOPATHY: NEGATIVE

## 2020-08-24 ENCOUNTER — MYC MEDICAL ADVICE (OUTPATIENT)
Dept: FAMILY MEDICINE | Facility: CLINIC | Age: 50
End: 2020-08-24

## 2020-08-24 NOTE — TELEPHONE ENCOUNTER
I printed the order from June and placed it at [facility] desk, can someone bring that to pharmacy and see if they do need something else for this?  Jimena Grider PA-C

## 2020-08-31 ENCOUNTER — MYC MEDICAL ADVICE (OUTPATIENT)
Dept: FAMILY MEDICINE | Facility: CLINIC | Age: 50
End: 2020-08-31

## 2020-09-01 ENCOUNTER — MYC MEDICAL ADVICE (OUTPATIENT)
Dept: FAMILY MEDICINE | Facility: CLINIC | Age: 50
End: 2020-09-01

## 2020-09-01 ENCOUNTER — TELEPHONE (OUTPATIENT)
Dept: FAMILY MEDICINE | Facility: CLINIC | Age: 50
End: 2020-09-01

## 2020-09-01 DIAGNOSIS — E11.9 TYPE 2 DIABETES MELLITUS WITHOUT COMPLICATION, WITHOUT LONG-TERM CURRENT USE OF INSULIN (H): ICD-10-CM

## 2020-09-01 NOTE — TELEPHONE ENCOUNTER
Please resubmit prior auth for trulicity.  Patient has failed bydureon, victoza, and metformin. Side effects, no improvement in glucose readings/diabetes.  Jimena Grider PA-C

## 2020-09-01 NOTE — TELEPHONE ENCOUNTER
Please send a new rx to the pharmacy.    Prior Authorization Approval    Authorization Effective Date: 8/1/2020  Authorization Expiration Date: 9/1/2021  Medication: Trulicity- APPROVED   Approved Dose/Quantity:   Reference #:     Insurance Company: [employer] - Phone [phone] Fax [phone]  Expected CoPay:       CoPay Card Available:      Foundation Assistance Needed:    Which Pharmacy is filling the prescription (Not needed for infusion/clinic administered): [facility] [place] [place] - [id number]  Pharmacy Notified: Yes- Pharmacy needs a new prescription.  Patient Notified: Comment:

## 2020-09-01 NOTE — TELEPHONE ENCOUNTER
Per provider-  Please resubmit prior auth for trulicity.  Patient has failed bydureon, victoza, and metformin. Side effects, no improvement in glucose readings/diabetes.  Jimena Grider PA-C

## 2020-09-01 NOTE — TELEPHONE ENCOUNTER
Central Prior Authorization Team  Phone: [phone]    PA Initiation    Medication: Trulicity  Insurance Company: [employer] - Phone [phone] Fax [phone]  Pharmacy Filling the Rx: [facility] - [id number]  Filling Pharmacy Phone: [phone]  Filling Pharmacy Fax:    Start Date: 9/1/2020

## 2020-09-01 NOTE — TELEPHONE ENCOUNTER
Ana Laura Feng           8:42 AM   Note      Per provider-  Please resubmit prior auth for trulicity.  Patient has failed bydureon, victoza, and metformin. Side effects, no improvement in glucose readings/diabetes.  Jimena Grider PA-C

## 2020-09-02 RX ORDER — DULAGLUTIDE 0.75 MG/.5ML
0.75 INJECTION, SOLUTION SUBCUTANEOUS
Qty: 0.5 ML | Refills: 11 | Status: SHIPPED | OUTPATIENT
Start: 2020-09-02 | End: 2021-03-01 | Stop reason: DRUGHIGH

## 2021-01-15 ENCOUNTER — HEALTH MAINTENANCE LETTER (OUTPATIENT)
Age: 51
End: 2021-01-15

## 2021-02-17 ENCOUNTER — MYC MEDICAL ADVICE (OUTPATIENT)
Dept: FAMILY MEDICINE | Facility: CLINIC | Age: 51
End: 2021-02-17

## 2021-03-01 ENCOUNTER — OFFICE VISIT (OUTPATIENT)
Dept: FAMILY MEDICINE | Facility: CLINIC | Age: 51
End: 2021-03-01
Payer: COMMERCIAL

## 2021-03-01 VITALS
HEIGHT: 72 IN | RESPIRATION RATE: 16 BRPM | HEART RATE: 85 BPM | OXYGEN SATURATION: 98 % | TEMPERATURE: 99 F | SYSTOLIC BLOOD PRESSURE: 136 MMHG | DIASTOLIC BLOOD PRESSURE: 78 MMHG | BODY MASS INDEX: 35.03 KG/M2 | WEIGHT: 258.6 LBS

## 2021-03-01 DIAGNOSIS — J30.81 ALLERGIC RHINITIS DUE TO CATS: ICD-10-CM

## 2021-03-01 DIAGNOSIS — I10 BENIGN ESSENTIAL HYPERTENSION: ICD-10-CM

## 2021-03-01 DIAGNOSIS — Z11.59 NEED FOR HEPATITIS C SCREENING TEST: ICD-10-CM

## 2021-03-01 DIAGNOSIS — E78.5 HYPERLIPIDEMIA LDL GOAL <100: ICD-10-CM

## 2021-03-01 DIAGNOSIS — E11.9 TYPE 2 DIABETES MELLITUS WITHOUT COMPLICATION, WITHOUT LONG-TERM CURRENT USE OF INSULIN (H): Primary | ICD-10-CM

## 2021-03-01 LAB
CHOLEST SERPL-MCNC: 223 MG/DL
HBA1C MFR BLD: 8.6 % (ref 0–5.6)
HCV AB SERPL QL IA: NONREACTIVE
HDLC SERPL-MCNC: 46 MG/DL
LDLC SERPL CALC-MCNC: 149 MG/DL
NONHDLC SERPL-MCNC: 177 MG/DL
TRIGL SERPL-MCNC: 140 MG/DL

## 2021-03-01 PROCEDURE — 80061 LIPID PANEL: CPT | Performed by: PHYSICIAN ASSISTANT

## 2021-03-01 PROCEDURE — 99214 OFFICE O/P EST MOD 30 MIN: CPT | Performed by: PHYSICIAN ASSISTANT

## 2021-03-01 PROCEDURE — 99207 PR FOOT EXAM NO CHARGE: CPT | Performed by: PHYSICIAN ASSISTANT

## 2021-03-01 PROCEDURE — 83036 HEMOGLOBIN GLYCOSYLATED A1C: CPT | Performed by: PHYSICIAN ASSISTANT

## 2021-03-01 PROCEDURE — 86803 HEPATITIS C AB TEST: CPT | Performed by: PHYSICIAN ASSISTANT

## 2021-03-01 PROCEDURE — 36415 COLL VENOUS BLD VENIPUNCTURE: CPT | Performed by: PHYSICIAN ASSISTANT

## 2021-03-01 RX ORDER — ALBUTEROL SULFATE 90 UG/1
2 AEROSOL, METERED RESPIRATORY (INHALATION) EVERY 6 HOURS PRN
Qty: 18 G | Refills: 1 | Status: SHIPPED | OUTPATIENT
Start: 2021-03-01 | End: 2021-05-03

## 2021-03-01 RX ORDER — DULAGLUTIDE 1.5 MG/.5ML
1.5 INJECTION, SOLUTION SUBCUTANEOUS
Qty: 0.5 ML | Refills: 11 | Status: SHIPPED | OUTPATIENT
Start: 2021-03-01 | End: 2022-02-02

## 2021-03-01 RX ORDER — FLUTICASONE PROPIONATE 110 UG/1
1 AEROSOL, METERED RESPIRATORY (INHALATION) 2 TIMES DAILY
Qty: 12 G | Refills: 1 | Status: SHIPPED | OUTPATIENT
Start: 2021-03-01 | End: 2022-05-11 | Stop reason: SINTOL

## 2021-03-01 RX ORDER — AMLODIPINE BESYLATE 10 MG/1
10 TABLET ORAL DAILY
Qty: 90 TABLET | Refills: 3 | Status: SHIPPED | OUTPATIENT
Start: 2021-03-01 | End: 2022-05-11 | Stop reason: SINTOL

## 2021-03-01 ASSESSMENT — MIFFLIN-ST. JEOR: SCORE: 2063

## 2021-03-01 NOTE — PATIENT INSTRUCTIONS
Increase trulicity  mychart me in 1 month if blood sugars still high    Try the flovent daily for allergies/wheezing  Albuterol as needed  Instead of cetirizine try allegra (fexofenadine)    Monitor diabetic neuropathy    Increase norvasc to 10 mg - new dose sent. You can take 2 of the 5 mg tablets until you run out.

## 2021-03-01 NOTE — PROGRESS NOTES
"    Assessment & Plan     ASSESSMENT/PLAN:      ICD-10-CM    1. Type 2 diabetes mellitus without complication, without long-term current use of insulin (H)  E11.9 Hemoglobin A1c     Lipid panel reflex to direct LDL Non-fasting     dulaglutide (TRULICITY) 1.5 MG/0.5ML pen     FOOT EXAM   2. Need for hepatitis C screening test  Z11.59 Hepatitis C Screen Reflex to HCV RNA Quant and Genotype   3. Benign essential hypertension  I10 amLODIPine (NORVASC) 10 MG tablet   4. Allergic rhinitis due to cats  J30.81 albuterol (PROAIR HFA/PROVENTIL HFA/VENTOLIN HFA) 108 (90 Base) MCG/ACT inhaler     fluticasone (FLOVENT HFA) 110 MCG/ACT inhaler   5. Hyperlipidemia LDL goal <100  E78.5      - diabetes: worsening. Will increase trulicity, max dose is 4.5 mg/ week. He will mychart me in the next 1 month so we can increase as needed at monthly intervals.  - possible neuropathy. Will monitor. Hopefully will improve, if not we briefly discussed gabapentin etc  - allergies. He is hoping that cat will not be with them long term but he is having significant reactions. Will trial flovent, albuterol PRN, stronger antihistamine. Discussed allergy referral if needed.  - hypertension: will increase norvasc. Discussed possible side effect of edema to watch for.  - hyperlipidemia. Increased to lipitor 80 mg this summer. Will recheck labs.    Patient Instructions   Increase trulicity  mychart me in 1 month if blood sugars still high    Try the flovent daily for allergies/wheezing  Albuterol as needed  Instead of cetirizine try allegra (fexofenadine)    Monitor diabetic neuropathy    Increase norvasc to 10 mg - new dose sent. You can take 2 of the 5 mg tablets until you run out.     BMI:   Estimated body mass index is 34.95 kg/m  as calculated from the following:    Height as of this encounter: 1.832 m (6' 0.13\").    Weight as of this encounter: 117.3 kg (258 lb 9.6 oz).   Weight management plan: Discussed healthy diet and exercise " guidelines    Return in about 6 months (around 9/1/2021).    KWADWO Baltazar [facility] [place] is a 50 year old who presents for the following health issues     HPI       Diabetes Follow-up      How often are you checking your blood sugar? Will check it when he feels off but other then that none    What concerns do you have today about your diabetes? Blood sugar is often 200's in the morning, afternoon sugar will go down     Do you have any of these symptoms? (Select all that apply)  Numbness in feet and Burning in feet mainly at night time is comes and goes, lasting for at least 2 hours      Hyperlipidemia Follow-Up      Are you regularly taking any medication or supplement to lower your cholesterol?   Yes- Lipitor 80 mg, 1 tablet daily    Are you having muscle aches or other side effects that you think could be caused by your cholesterol lowering medication?  No    Hypertension Follow-up      Do you check your blood pressure regularly outside of the clinic? Yes     Are you following a low salt diet? Yes    Are your blood pressures ever more than 140 on the top number (systolic) OR more   than 90 on the bottom number (diastolic), for example 140/90? No, will occasionally get low 140's    BP Readings from Last 2 Encounters:   03/01/21 136/78   08/05/20 (!) 142/80     Hemoglobin A1C (%)   Date Value   03/01/2021 8.6 (H)   08/05/2020 7.6 (H)     LDL Cholesterol Calculated (mg/dL)   Date Value   03/01/2021 149 (H)   08/05/2020 148 (H)         How many servings of fruits and vegetables do you eat daily?  2-3    On average, how many sweetened beverages do you drink each day (Examples: soda, juice, sweet tea, etc.  Do NOT count diet or artificially sweetened beverages)?   0    How many days per week do you exercise enough to make your heart beat faster? 6    How many minutes a day do you exercise enough to make your heart beat faster? 60 or more    How many days per week do you  "miss taking your medication? 0    Has had inhaler in past for cat allergies - wife's parents both passed away recently and she got the cat  Has used OTC benadryl, zyrtec - helps some.  Wheezing, can't get a deep breath. Better when he's out of the house, but he is working from home so that's tough.  Tried son's inhaler, it helped a lot    1 month after trulicity (September) he was in 110s fasting, but then has been creeping up    He feels since sugars have been getting higher that at night his feet bother him, thinks neuropathy. It is mild paresthesias    Review of Systems   Other than noted above, general, HEENT, respiratory, cardiac, MS, and gastrointestinal systems are negative.       Objective    /78   Pulse 85   Temp 99  F (37.2  C) (Tympanic)   Resp 16   Ht 1.832 m (6' 0.13\")   Wt 117.3 kg (258 lb 9.6 oz)   SpO2 98%   BMI 34.95 kg/m    Body mass index is 34.95 kg/m .  Physical Exam   GENERAL: healthy, alert and no distress  RESP: lungs clear to auscultation - no rales, rhonchi or wheezes  CV: regular rate and rhythm, normal S1 S2, no S3 or S4, no murmur, click or rub, no peripheral edema and peripheral pulses strong  MS: no gross musculoskeletal defects noted, no edema  Diabetic foot exam: normal DP and PT pulses, no trophic changes or ulcerative lesions and normal sensory exam    Results for orders placed or performed in visit on 03/01/21   Hepatitis C Screen Reflex to HCV RNA Quant and Genotype     Status: None   Result Value Ref Range    Hepatitis C Antibody Nonreactive NR^Nonreactive   Hemoglobin A1c     Status: Abnormal   Result Value Ref Range    Hemoglobin A1C 8.6 (H) 0 - 5.6 %   Lipid panel reflex to direct LDL Non-fasting     Status: Abnormal   Result Value Ref Range    Cholesterol 223 (H) <200 mg/dL    Triglycerides 140 <150 mg/dL    HDL Cholesterol 46 >39 mg/dL    LDL Cholesterol Calculated 149 (H) <100 mg/dL    Non HDL Cholesterol 177 (H) <130 mg/dL           "

## 2021-03-02 PROBLEM — E78.5 HYPERLIPIDEMIA LDL GOAL <100: Status: ACTIVE | Noted: 2021-03-02

## 2021-04-12 NOTE — LETTER
"September 27, 2018      Karan Josue  [address] [facility] [address]        Hi Karan,     We discussed your HbA1C in the office - it is 10.8.   Here are the results of your latest lipid tests:     Your total cholesterol  should be less than 200.   Your total is 213.     The total cholesterol is made up of your HDL and LDL.     The HDL is the \"good\" cholesterol and when it is high (over 60), it decreases the risk for heart attack and stroke.  When HDL is less than 40, it raises the risk for these problems.  You can raise the HDL by eating fish and by performing regular aerobic exercise (e.g. running, biking, swimming, aerobics).   Your HDL is 34.     The LDL is the \"bad\" cholesterol linked to heart disease and stroke. For those who have heart disease or diabetes, their LDL should be less than 100.  For all others, the LDL should be less than 130.  You can lower this by following a low fat and low cholesterol diet.   Your LDL is 158.     Your triglycerides (free fats in the blood) should be less than 150.  You can lower these with a low fat diet, and for those with diabetes, by improving blood sugar control.   Your triglycerides are 213.     It is recommended that you start a medication for cholesterol called a statin. We can discuss this at the next visit.   Other labs are normal/stable.     Let me know if you have questions or concerns,   Jimena Grider PA-C/ ron                  " Xray Chest 2 Views PA/Lat

## 2021-04-14 NOTE — PROGRESS NOTES
Panel Management Review      Patient has the following on his problem list:     Diabetes    ASA: Passed    Last A1C  Lab Results   Component Value Date    A1C 8.6 03/01/2021    A1C 7.6 08/05/2020    A1C 9.7 12/21/2018    A1C 10.8 09/26/2018     A1C tested: FAILED    Last LDL:    Lab Results   Component Value Date    CHOL 223 03/01/2021     Lab Results   Component Value Date    HDL 46 03/01/2021     Lab Results   Component Value Date     03/01/2021     Lab Results   Component Value Date    TRIG 140 03/01/2021     No results found for: CHOLHDLRATIO  Lab Results   Component Value Date    NHDL 177 03/01/2021       Is the patient on a Statin? YES             Is the patient on Aspirin? YES    Medications     HMG CoA Reductase Inhibitors     atorvastatin (LIPITOR) 80 MG tablet       Salicylates     aspirin 81 MG tablet             Last three blood pressure readings:  BP Readings from Last 3 Encounters:   03/01/21 136/78   08/05/20 (!) 142/80   02/18/19 134/88       Date of last diabetes office visit: 3/1/2021     Tobacco History:     History   Smoking Status     Never Smoker   Smokeless Tobacco     Never Used         Hypertension   Last three blood pressure readings:  BP Readings from Last 3 Encounters:   03/01/21 136/78   08/05/20 (!) 142/80   02/18/19 134/88     Blood pressure: Passed    HTN Guidelines:  Less than 140/90      Composite cancer screening  Chart review shows that this patient is due/due soon for the following None  Summary:    Patient is due/failing the following:   A1C and LDL    Action needed:   Patient needs fasting lab only appointment    Type of outreach:    Phone, left message for patient to call back.     Questions for provider review:    None                                                                                                                                    Magdalena Peter [facility]     Chart routed to Care Team .

## 2021-05-03 DIAGNOSIS — J30.81 ALLERGIC RHINITIS DUE TO CATS: ICD-10-CM

## 2021-05-03 RX ORDER — ALBUTEROL SULFATE 90 UG/1
2 AEROSOL, METERED RESPIRATORY (INHALATION) EVERY 6 HOURS PRN
Qty: 8.5 G | Refills: 1 | Status: SHIPPED | OUTPATIENT
Start: 2021-05-03 | End: 2021-08-19

## 2021-05-20 ENCOUNTER — IMMUNIZATION (OUTPATIENT)
Dept: LAB | Facility: CLINIC | Age: 51
End: 2021-05-20
Payer: COMMERCIAL

## 2021-06-02 VITALS — HEIGHT: 74 IN | WEIGHT: 263 LBS | BODY MASS INDEX: 33.75 KG/M2

## 2021-06-04 ENCOUNTER — TRANSFERRED RECORDS (OUTPATIENT)
Dept: HEALTH INFORMATION MANAGEMENT | Facility: CLINIC | Age: 51
End: 2021-06-04

## 2021-06-09 ENCOUNTER — RECORDS - HEALTHEAST (OUTPATIENT)
Dept: CARDIOLOGY | Facility: CLINIC | Age: 51
End: 2021-06-09

## 2021-08-10 ENCOUNTER — TELEPHONE (OUTPATIENT)
Dept: ENDOCRINOLOGY | Facility: CLINIC | Age: 51
End: 2021-08-10

## 2021-08-10 NOTE — TELEPHONE ENCOUNTER
Prior Authorization Retail Medication Request    Medication/Dose: (RENEWAL) - dulaglutide (TRULICITY) 1.5 MG/0.5ML pen  ICD code (if different than what is on RX):  Type 2 diabetes mellitus without complication, without long-term current use of insulin (H) [E11.9]  Previously Tried and Failed:    Rationale:      Insurance Name:  WhoKnows  Insurance ID:   [id number]    Pharmacy Information (if different than what is on RX)  Name:  [facility] [place]  [place] - [address] [facility] [address]  Phone:  [phone]

## 2021-08-10 NOTE — TELEPHONE ENCOUNTER
Central Prior Authorization Team   Phone: [phone]      PA Initiation    Medication: (RENEWAL) - dulaglutide (TRULICITY) 1.5 MG/0.5ML pen  Insurance Company: [employer] - Phone [phone] Fax [phone]  Pharmacy Filling the Rx: [facility] - [id number]  Filling Pharmacy Phone: [phone]  Filling Pharmacy Fax: [phone]  Start Date: 8/10/2021

## 2021-08-11 NOTE — TELEPHONE ENCOUNTER
Prior Authorization Approval    Authorization Effective Date: 7/11/2021  Authorization Expiration Date: 8/11/2022  Medication: (RENEWAL) - dulaglutide (TRULICITY) 1.5 MG/0.5ML pen - Approved  Approved Dose/Quantity:   Reference #:     Insurance Company: [employer] - Phone [phone] Fax [phone]  Expected CoPay:       CoPay Card Available: No    Foundation Assistance Needed:    Which Pharmacy is filling the prescription (Not needed for infusion/clinic administered): [facility] [place], [place] - [address]  Pharmacy Notified: Yes  Patient Notified: Yes

## 2021-08-19 DIAGNOSIS — E11.65 TYPE 2 DIABETES MELLITUS WITH HYPERGLYCEMIA, WITHOUT LONG-TERM CURRENT USE OF INSULIN (H): ICD-10-CM

## 2021-08-19 DIAGNOSIS — J30.81 ALLERGIC RHINITIS DUE TO CATS: ICD-10-CM

## 2021-08-19 RX ORDER — BLOOD SUGAR DIAGNOSTIC
STRIP MISCELLANEOUS
Qty: 100 STRIP | Refills: 0 | Status: SHIPPED | OUTPATIENT
Start: 2021-08-19

## 2021-08-19 RX ORDER — ALBUTEROL SULFATE 90 UG/1
2 AEROSOL, METERED RESPIRATORY (INHALATION) EVERY 6 HOURS PRN
Qty: 8.5 G | Refills: 0 | Status: SHIPPED | OUTPATIENT
Start: 2021-08-19 | End: 2022-05-11

## 2021-08-19 NOTE — TELEPHONE ENCOUNTER
RX refilled x1 because needs hgbA1c, BMP, microalbumin labs. Labs are ordered.  Matt Edmondson RN

## 2021-10-24 ENCOUNTER — HEALTH MAINTENANCE LETTER (OUTPATIENT)
Age: 51
End: 2021-10-24

## 2022-02-02 DIAGNOSIS — E11.9 TYPE 2 DIABETES MELLITUS WITHOUT COMPLICATION, WITHOUT LONG-TERM CURRENT USE OF INSULIN (H): ICD-10-CM

## 2022-02-02 RX ORDER — DULAGLUTIDE 1.5 MG/.5ML
1.5 INJECTION, SOLUTION SUBCUTANEOUS
Qty: 0.5 ML | Refills: 0 | Status: SHIPPED | OUTPATIENT
Start: 2022-02-02 | End: 2022-03-02

## 2022-02-02 NOTE — TELEPHONE ENCOUNTER
"Requested Prescriptions   Pending Prescriptions Disp Refills     dulaglutide (TRULICITY) 1.5 MG/0.5ML pen 0.5 mL 11     Sig: Inject 1.5 mg Subcutaneous every 7 days       GLP-1 Agonists Protocol Failed - 2/2/2022  3:11 PM        Failed - HgbA1C in past 3 or 6 months     If HgbA1C is 8 or greater, it needs to be on file within the past 3 months.  If less than 8, must be on file within the past 6 months.     Recent Labs   Lab Test 03/01/21  1142   A1C 8.6*             Failed - Normal serum creatinine on file in past 12 months     Recent Labs   Lab Test 08/05/20  1231   CR 0.67       Ok to refill medication if creatinine is low          Failed - Recent (6 mo) or future (30 days) visit within the authorizing provider's specialty     Patient had office visit in the last 6 months or has a visit in the next 30 days with authorizing provider.  See \"Patient Info\" tab in inbasket, or \"Choose Columns\" in Meds & Orders section of the refill encounter.            Passed - Medication is active on med list        Passed - Patient is age 18 or older             "

## 2022-02-13 ENCOUNTER — HEALTH MAINTENANCE LETTER (OUTPATIENT)
Age: 52
End: 2022-02-13

## 2022-02-28 ENCOUNTER — TRANSFERRED RECORDS (OUTPATIENT)
Dept: MULTI SPECIALTY CLINIC | Facility: CLINIC | Age: 52
End: 2022-02-28
Payer: COMMERCIAL

## 2022-02-28 LAB — RETINOPATHY: NORMAL

## 2022-03-23 ENCOUNTER — TRANSFERRED RECORDS (OUTPATIENT)
Dept: HEALTH INFORMATION MANAGEMENT | Facility: CLINIC | Age: 52
End: 2022-03-23
Payer: COMMERCIAL

## 2022-04-28 DIAGNOSIS — E11.9 TYPE 2 DIABETES MELLITUS WITHOUT COMPLICATION, WITHOUT LONG-TERM CURRENT USE OF INSULIN (H): ICD-10-CM

## 2022-04-28 NOTE — TELEPHONE ENCOUNTER
Routing refill request to provider for review/approval because:  Patient needs to be seen because it has been more than 1 year since last office visit.patient has visit scheduled 5/11/22.  Aarti Espinoza RN

## 2022-04-29 RX ORDER — DULAGLUTIDE 1.5 MG/.5ML
INJECTION, SOLUTION SUBCUTANEOUS
Qty: 2 ML | Refills: 0 | Status: SHIPPED | OUTPATIENT
Start: 2022-04-29 | End: 2022-05-11 | Stop reason: DRUGHIGH

## 2022-05-11 ENCOUNTER — OFFICE VISIT (OUTPATIENT)
Dept: FAMILY MEDICINE | Facility: CLINIC | Age: 52
End: 2022-05-11
Payer: COMMERCIAL

## 2022-05-11 VITALS
BODY MASS INDEX: 34.01 KG/M2 | HEART RATE: 74 BPM | RESPIRATION RATE: 20 BRPM | DIASTOLIC BLOOD PRESSURE: 84 MMHG | SYSTOLIC BLOOD PRESSURE: 134 MMHG | WEIGHT: 251.1 LBS | OXYGEN SATURATION: 97 % | HEIGHT: 72 IN | TEMPERATURE: 97.5 F

## 2022-05-11 DIAGNOSIS — E11.9 TYPE 2 DIABETES MELLITUS WITHOUT COMPLICATION, WITHOUT LONG-TERM CURRENT USE OF INSULIN (H): ICD-10-CM

## 2022-05-11 DIAGNOSIS — Z80.42 FAMILY HISTORY OF PROSTATE CANCER: ICD-10-CM

## 2022-05-11 DIAGNOSIS — Z12.11 SCREEN FOR COLON CANCER: ICD-10-CM

## 2022-05-11 DIAGNOSIS — E11.65 TYPE 2 DIABETES MELLITUS WITH HYPERGLYCEMIA, WITHOUT LONG-TERM CURRENT USE OF INSULIN (H): ICD-10-CM

## 2022-05-11 DIAGNOSIS — F31.9 BIPOLAR I DISORDER (H): ICD-10-CM

## 2022-05-11 DIAGNOSIS — Z00.00 ENCOUNTER FOR ROUTINE ADULT HEALTH EXAMINATION WITHOUT ABNORMAL FINDINGS: Primary | ICD-10-CM

## 2022-05-11 DIAGNOSIS — E66.01 MORBID OBESITY (H): ICD-10-CM

## 2022-05-11 DIAGNOSIS — I10 BENIGN ESSENTIAL HYPERTENSION: ICD-10-CM

## 2022-05-11 DIAGNOSIS — Z12.5 SCREENING FOR PROSTATE CANCER: ICD-10-CM

## 2022-05-11 DIAGNOSIS — D04.30 SQUAMOUS CELL CARCINOMA IN SITU OF SKIN OF FACE: ICD-10-CM

## 2022-05-11 DIAGNOSIS — J30.81 ALLERGIC RHINITIS DUE TO CATS: ICD-10-CM

## 2022-05-11 DIAGNOSIS — E78.5 HYPERLIPIDEMIA LDL GOAL <100: ICD-10-CM

## 2022-05-11 DIAGNOSIS — I20.0 UNSTABLE ANGINA (H): ICD-10-CM

## 2022-05-11 LAB
ANION GAP SERPL CALCULATED.3IONS-SCNC: 4 MMOL/L (ref 3–14)
BUN SERPL-MCNC: 13 MG/DL (ref 7–30)
CALCIUM SERPL-MCNC: 9.3 MG/DL (ref 8.5–10.1)
CHLORIDE BLD-SCNC: 103 MMOL/L (ref 94–109)
CHOLEST SERPL-MCNC: 213 MG/DL
CO2 SERPL-SCNC: 27 MMOL/L (ref 20–32)
CREAT SERPL-MCNC: 0.73 MG/DL (ref 0.66–1.25)
FASTING STATUS PATIENT QL REPORTED: YES
GFR SERPL CREATININE-BSD FRML MDRD: >90 ML/MIN/1.73M2
GLUCOSE BLD-MCNC: 196 MG/DL (ref 70–99)
HBA1C MFR BLD: 9.5 % (ref 0–5.6)
HDLC SERPL-MCNC: 40 MG/DL
LDLC SERPL CALC-MCNC: 150 MG/DL
NONHDLC SERPL-MCNC: 173 MG/DL
POTASSIUM BLD-SCNC: 3.8 MMOL/L (ref 3.4–5.3)
PSA SERPL-MCNC: 2.33 UG/L (ref 0–4)
SODIUM SERPL-SCNC: 134 MMOL/L (ref 133–144)
TRIGL SERPL-MCNC: 115 MG/DL

## 2022-05-11 PROCEDURE — 99396 PREV VISIT EST AGE 40-64: CPT | Performed by: PHYSICIAN ASSISTANT

## 2022-05-11 PROCEDURE — 80048 BASIC METABOLIC PNL TOTAL CA: CPT | Performed by: PHYSICIAN ASSISTANT

## 2022-05-11 PROCEDURE — 99214 OFFICE O/P EST MOD 30 MIN: CPT | Mod: 25 | Performed by: PHYSICIAN ASSISTANT

## 2022-05-11 PROCEDURE — 99207 PR FOOT EXAM NO CHARGE: CPT | Mod: 25 | Performed by: PHYSICIAN ASSISTANT

## 2022-05-11 PROCEDURE — 83036 HEMOGLOBIN GLYCOSYLATED A1C: CPT | Performed by: PHYSICIAN ASSISTANT

## 2022-05-11 PROCEDURE — 36415 COLL VENOUS BLD VENIPUNCTURE: CPT | Performed by: PHYSICIAN ASSISTANT

## 2022-05-11 PROCEDURE — 80061 LIPID PANEL: CPT | Performed by: PHYSICIAN ASSISTANT

## 2022-05-11 PROCEDURE — G0103 PSA SCREENING: HCPCS | Performed by: PHYSICIAN ASSISTANT

## 2022-05-11 RX ORDER — DULAGLUTIDE 3 MG/.5ML
3 INJECTION, SOLUTION SUBCUTANEOUS WEEKLY
Qty: 2 ML | Refills: 1 | Status: SHIPPED | OUTPATIENT
Start: 2022-05-11 | End: 2022-07-07

## 2022-05-11 RX ORDER — ALBUTEROL SULFATE 90 UG/1
2 AEROSOL, METERED RESPIRATORY (INHALATION) EVERY 6 HOURS PRN
Qty: 8.5 G | Refills: 0 | Status: SHIPPED | OUTPATIENT
Start: 2022-05-11

## 2022-05-11 RX ORDER — LOSARTAN POTASSIUM 50 MG/1
50 TABLET ORAL DAILY
Qty: 90 TABLET | Refills: 1 | Status: SHIPPED | OUTPATIENT
Start: 2022-05-11 | End: 2023-01-25

## 2022-05-11 RX ORDER — ROSUVASTATIN CALCIUM 20 MG/1
20 TABLET, COATED ORAL DAILY
Qty: 90 TABLET | Refills: 1 | Status: SHIPPED | OUTPATIENT
Start: 2022-05-11 | End: 2023-01-25

## 2022-05-11 RX ORDER — FLUOROURACIL 50 MG/G
CREAM TOPICAL
COMMUNITY
Start: 2022-03-20 | End: 2024-04-17

## 2022-05-11 RX ORDER — DULAGLUTIDE 1.5 MG/.5ML
INJECTION, SOLUTION SUBCUTANEOUS
Qty: 2 ML | Refills: 0 | Status: CANCELLED | OUTPATIENT
Start: 2022-05-11

## 2022-05-11 ASSESSMENT — ENCOUNTER SYMPTOMS
MYALGIAS: 0
HEARTBURN: 0
PALPITATIONS: 0
COUGH: 0
DIZZINESS: 0
SHORTNESS OF BREATH: 0
HEMATOCHEZIA: 0
JOINT SWELLING: 0
PARESTHESIAS: 0
SORE THROAT: 0
CONSTIPATION: 0
NAUSEA: 0
EYE PAIN: 0
DYSURIA: 0
DIARRHEA: 0
ABDOMINAL PAIN: 0
CHILLS: 0
HEADACHES: 0
FREQUENCY: 0
NERVOUS/ANXIOUS: 0
FEVER: 0
HEMATURIA: 0
ARTHRALGIAS: 0
WEAKNESS: 0

## 2022-05-11 ASSESSMENT — PAIN SCALES - GENERAL: PAINLEVEL: NO PAIN (0)

## 2022-05-11 NOTE — PATIENT INSTRUCTIONS
- monitor blood sugars. We will increase your trulicity after your labwork  - start crestor 20 mg: start by cutting in half (10 mg) daily and when tolerated increase  - start losartan 50 mg: recheck electrolytes in 2-4 weeks. Monitor blood pressure: goal <130/80      I placed an order for Cologuard test - this is a screen for colon cancer.  - Cologuard finds 92% of colon cancers overall.  - 94% are covered completely by insurance. Please check with your insurance, check [email or web address], or call [employer] (available 24/7) at [phone].  - You will be mailed a test kit with instructions. Please complete and mail this back to the address provided.  - If this test is negative, you will need to repeat screening in 3 years. If this test is positive, you will need to have a colonoscopy.    Cologuard may not be right for you if:  - You have a personal history of colon cancer, adenomas, or other related cancers  - You have a family history of colon cancer (one or more first-degree relatives diagnosed with colon or rectal cancer before the age of 60, or two or more first degree relatives diagnosed with colon or rectal cancer of any age)  - You have had a positive result from another screening method in the last six months  - You have been diagnosed with a condition that places you at high risk for colon cancer. These include but are not limited to: inflammatory bowel disease, chronic ulcerative colitis, Crohn's disease, familial adenomatous polyposis  - You have been diagnosed with a relevant cancer syndrome passed on from your family, such as hereditary nonpolyposis colorectal cancer syndrome, Peutz-Jeghers syndrome, MYH-associated polyposis, Murrieta s syndrome, Turcot s (or Crail s) syndrome, Cowden s syndrome, juvenile polyposis, Cronkhite-Yudi syndrome, neurofibromatosis, or familial hyperplastic polyposis  - Results should be interpreted with caution for individuals over age 75,  as the rate of false positives increases with age    For more information, talk to me or check out: [email or web address]

## 2022-05-11 NOTE — PROGRESS NOTES
SUBJECTIVE:   CC: Karan Josue is an 52 year old male who presents for preventative health visit.   Chief Complaint   Patient presents with     Physical     Physical - Not fasting today      Diabetes     Medication Reconciliation     Patient stopped taking Amlodipine , and Atorvastatin 8 months ago - due to he was  having  muscle aches      Health Maintenance     Declined vaccines today . Due for FIT - will get kit today      Patient has been advised of split billing requirements and indicates understanding: Yes  Healthy Habits:     Getting at least 3 servings of Calcium per day:  Yes    Bi-annual eye exam:  Yes    Dental care twice a year:  Yes    Sleep apnea or symptoms of sleep apnea:  None    Diet:  Diabetic    Frequency of exercise:  2-3 days/week    Duration of exercise:  30-45 minutes    Taking medications regularly:  Yes    Medication side effects:  Muscle aches    PHQ-2 Total Score: 0    Additional concerns today:  No    Diabetes Follow-up    How often are you checking your blood sugar? A few times a week  What time of day are you checking your blood sugars (select all that apply)?  Before meals  Have you had any blood sugars above 200?  Yes : 212  Have you had any blood sugars below 70?  No    What symptoms do you notice when your blood sugar is low?  None    What concerns do you have today about your diabetes? None     Do you have any of these symptoms? (Select all that apply)  No numbness or tingling in feet.  No redness, sores or blisters on feet.  No complaints of excessive thirst.  No reports of blurry vision.  No significant changes to weight.    Have you had a diabetic eye exam in the last 12 months? Yes- Date of last eye exam: 2/2022,  Location: [facility]      * blood sugars sometimes over 200,doesn't check that often.   Has been on metformin, victoza, bydureon - stopped due to GI upset  Has not lost weight with trulicity discussed    Hyperlipidemia Follow-Up      Are you regularly  taking any medication or supplement to lower your cholesterol?   No    Are you having muscle aches or other side effects that you think could be caused by your cholesterol lowering medication?  Yes- stopped due to musche acles     Hypertension Follow-up      Do you check your blood pressure regularly outside of the clinic? No     Are you following a low salt diet? Yes    Are your blood pressures ever more than 140 on the top number (systolic) OR more   than 90 on the bottom number (diastolic), for example 140/90? Yes    BP Readings from Last 2 Encounters:   05/11/22 134/84   03/01/21 136/78     Hemoglobin A1C POCT (%)   Date Value   03/01/2021 8.6 (H)   08/05/2020 7.6 (H)     Hemoglobin A1C (%)   Date Value   05/11/2022 9.5 (H)     LDL Cholesterol Calculated (mg/dL)   Date Value   05/11/2022 150 (H)   03/01/2021 149 (H)   08/05/2020 148 (H)     * stopped meds 8 months ago due to muscle pain, wasn't sure what was causing that - stopped amlodipine and atorvastatin  Amlodipine: he thinks caused dry cough. Also had that with lisinopril.    * occasional albuterol for cat allergy, has cats - once a week at most. Worse around more cats like at sister in laws  flovent - made throat scratchy    * had MOHS this year for squamous cell on right side of head  [facility]  Plans to use effudex this fall    Today's PHQ-2 Score:   PHQ-2 ( 1999 Pfizer) 5/11/2022   Q1: Little interest or pleasure in doing things 0   Q2: Feeling down, depressed or hopeless 0   PHQ-2 Score 0   PHQ-2 Total Score (12-17 Years)- Positive if 3 or more points; Administer PHQ-A if positive -   Q1: Little interest or pleasure in doing things Not at all   Q2: Feeling down, depressed or hopeless Not at all   PHQ-2 Score 0       Abuse: Current or Past(Physical, Sexual or Emotional)- No  Do you feel safe in your environment? Yes    Have you ever done Advance Care Planning? (For example, a Health Directive, POLST, or a discussion with a medical provider  or your loved ones about your wishes): Yes, patient states has an Advance Care Planning document and will bring a copy to the clinic.    Social History     Tobacco Use     Smoking status: Never Smoker     Smokeless tobacco: Never Used   Substance Use Topics     Alcohol use: Yes     Comment: occasional     If you drink alcohol do you typically have >3 drinks per day or >7 drinks per week? No    Alcohol Use 5/11/2022   Prescreen: >3 drinks/day or >7 drinks/week? No   Prescreen: >3 drinks/day or >7 drinks/week? -       Last PSA:   PSA   Date Value Ref Range Status   09/26/2018 1.92 0 - 4 ug/L Final     Comment:     Assay Method:  Chemiluminescence using Siemens Vista analyzer     Prostate Specific Antigen Screen   Date Value Ref Range Status   05/11/2022 2.33 0.00 - 4.00 ug/L Final       Reviewed orders with patient. Reviewed health maintenance and updated orders accordingly - Yes  Lab work is in process  Labs reviewed in EPIC  BP Readings from Last 3 Encounters:   05/11/22 134/84   03/01/21 136/78   08/05/20 (!) 142/80    Wt Readings from Last 3 Encounters:   05/11/22 113.9 kg (251 lb 1.6 oz)   03/01/21 117.3 kg (258 lb 9.6 oz)   02/18/19 119.1 kg (262 lb 9.6 oz)                  Patient Active Problem List   Diagnosis     Family history of prostate cancer     Adjustment disorder with mixed anxiety and depressed mood     Bipolar I disorder (H)     Type 2 diabetes mellitus with hyperglycemia, without long-term current use of insulin (H)     Obesity (BMI 35.0-39.9) with comorbidity (H)     Unstable angina (H)     Benign essential hypertension     Hyperlipidemia LDL goal <100     Squamous cell carcinoma in situ of skin of face     Past Surgical History:   Procedure Laterality Date     CV CORONARY ANGIOGRAM N/A 10/06/2018    Procedure: Coronary Angiogram;  Surgeon: Efrem Enriquez MD;  Location: [facility];  Service:      CV LEFT HEART CATHETERIZATION WITH LEFT VENTRICULOGRAM N/A 10/06/2018    Procedure:  Left Heart Catheterization with Left Ventriculogram;  Surgeon: Efrem Enriquez MD;  Location: [facility] Cath Lab;  Service:        Social History     Tobacco Use     Smoking status: Never Smoker     Smokeless tobacco: Never Used   Substance Use Topics     Alcohol use: Yes     Comment: occasional     Family History   Problem Relation Age of Onset     C.A.D. Father 58        nonfatal MI x 2     Prostate Cancer Father 59     Diabetes Father      Coronary Artery Disease Father      Heart Disease Father      Myocardial Infarction Brother 50     Prostate Cancer Maternal Grandfather 69     Prostate Cancer Maternal Uncle 48     Colon Cancer No family hx of            Reviewed and updated as needed this visit by clinical staff   Tobacco  Allergies  Meds  Problems  Med Hx  Surg Hx  Fam Hx  Soc   Hx          Reviewed and updated as needed this visit by Provider   Tobacco  Allergies  Meds  Problems  Med Hx  Surg Hx  Fam Hx           Past Medical History:   Diagnosis Date     NO ACTIVE PROBLEMS       Past Surgical History:   Procedure Laterality Date     CV CORONARY ANGIOGRAM N/A 10/06/2018    Procedure: Coronary Angiogram;  Surgeon: Efrem Enriquez MD;  Location: [facility] Cath Lab;  Service:      CV LEFT HEART CATHETERIZATION WITH LEFT VENTRICULOGRAM N/A 10/06/2018    Procedure: Left Heart Catheterization with Left Ventriculogram;  Surgeon: Efrem Enriquez MD;  Location: [facility] Lab;  Service:        Review of Systems   Constitutional: Negative for chills and fever.   HENT: Negative for congestion, ear pain, hearing loss and sore throat.    Eyes: Negative for pain and visual disturbance.   Respiratory: Negative for cough and shortness of breath.    Cardiovascular: Negative for chest pain, palpitations and peripheral edema.   Gastrointestinal: Negative for abdominal pain, constipation, diarrhea, heartburn, hematochezia and nausea.   Genitourinary: Negative for dysuria, frequency, genital  sores, hematuria and urgency.   Musculoskeletal: Negative for arthralgias, joint swelling and myalgias.   Skin: Negative for rash.   Neurological: Negative for dizziness, weakness, headaches and paresthesias.   Psychiatric/Behavioral: Negative for mood changes. The patient is not nervous/anxious.        OBJECTIVE:   /84   Pulse 74   Temp 97.5  F (36.4  C) (Tympanic)   Resp 20   Ht 1.829 m (6')   Wt 113.9 kg (251 lb 1.6 oz)   SpO2 97%   BMI 34.06 kg/m      Physical Exam  GENERAL: healthy, alert and no distress  EYES: Eyes grossly normal to inspection, PERRL and conjunctivae and sclerae normal  HENT: ear canals and TM's normal, nose and mouth without ulcers or lesions  NECK: no adenopathy, no asymmetry, masses, or scars and thyroid normal to palpation  RESP: lungs clear to auscultation - no rales, rhonchi or wheezes  CV: regular rate and rhythm, normal S1 S2, no S3 or S4, no murmur, click or rub, no peripheral edema and peripheral pulses strong  ABDOMEN: soft, nontender, no hepatosplenomegaly, no masses and bowel sounds normal  MS: no gross musculoskeletal defects noted, no edema  SKIN: no suspicious lesions or rashes  NEURO: Normal strength and tone, mentation intact and speech normal  BACK: no CVA tenderness, no paralumbar tenderness  PSYCH: mentation appears normal, affect normal/bright  LYMPH: no cervical, supraclavicular, axillary, or inguinal adenopathy  DIABETIC FOOT EXAM: normal DP and PT pulses, no trophic changes or ulcerative lesions and normal sensory exam     Diagnostic Test Results:  Labs reviewed in Epic    ASSESSMENT/PLAN:     ASSESSMENT/PLAN:      ICD-10-CM    1. Encounter for routine adult health examination without abnormal findings  Z00.00    2. Type 2 diabetes mellitus without complication, without long-term current use of insulin (H)  E11.9 HEMOGLOBIN A1C     Basic metabolic panel  (Ca, Cl, CO2, Creat, Gluc, K, Na, BUN)     Albumin Random Urine Quantitative with Creat Ratio      losartan (COZAAR) 50 MG tablet     HEMOGLOBIN A1C     Basic metabolic panel  (Ca, Cl, CO2, Creat, Gluc, K, Na, BUN)     Dulaglutide (TRULICITY) 3 MG/0.5ML SOPN     FOOT EXAM     **A1C FUTURE 3mo   3. Allergic rhinitis due to cats  J30.81 albuterol (PROAIR HFA/PROVENTIL HFA/VENTOLIN HFA) 108 (90 Base) MCG/ACT inhaler   4. Type 2 diabetes mellitus with hyperglycemia, without long-term current use of insulin (H)  E11.65 blood glucose (NO BRAND SPECIFIED) test strip   5. Benign essential hypertension  I10 losartan (COZAAR) 50 MG tablet   6. Hyperlipidemia LDL goal <100  E78.5 Lipid panel reflex to direct LDL Non-fasting     rosuvastatin (CRESTOR) 20 MG tablet     Lipid panel reflex to direct LDL Non-fasting   7. Family history of prostate cancer  Z80.42 PSA, screen     PSA, screen   8. Screening for prostate cancer  Z12.5 PSA, screen     PSA, screen   9. Bipolar I disorder (H)  F31.9    10. Obesity (BMI 35.0-39.9) with comorbidity (H)  E66.01    11. Unstable angina (H)  I20.0    12. Screen for colon cancer  Z12.11 COLOGUARD(EXACT SCIENCES)   13. Squamous cell carcinoma in situ of skin of face  D04.30      - history of angina diagnosis: seen in ER 2018 for chest pain, had angiogram which showed:   No significant CAD, very mild disease in proximal LAD  - bipolar/mental health:  Controlled/stable  - allergies: inhaler use rarely with greater exposure to cats  - diabetes: uncontrolled. Increase truilicity. Recommended mychart me sugars in 1 month, may increase trulicity to max dose. Can recheck A1C in 3 months, follow up in 6 months.  Hypertension/lipids: uncontrolled: see below. We discussed ASCVD risk/family history    Patient Instructions   - monitor blood sugars. We will increase your trulicity after your labwork  - start crestor 20 mg: start by cutting in half (10 mg) daily and when tolerated increase  - start losartan 50 mg: recheck electrolytes in 2-4 weeks. Monitor blood pressure: goal <130/80    COUNSELING:    Reviewed preventive health counseling, as reflected in patient instructions       Regular exercise       Healthy diet/nutrition       Colorectal cancer screening       Prostate cancer screening       The 10-year ASCVD risk score (Bonilla THURMAN Jr., et al., 2013) is: 15.8%    Values used to calculate the score:      Age: 52 years      Sex: Male      Is Non- : No      Diabetic: Yes      Tobacco smoker: No      Systolic Blood Pressure: 134 mmHg      Is BP treated: Yes      HDL Cholesterol: 40 mg/dL      Total Cholesterol: 213 mg/dL    Estimated body mass index is 34.06 kg/m  as calculated from the following:    Height as of this encounter: 1.829 m (6').    Weight as of this encounter: 113.9 kg (251 lb 1.6 oz).     Weight management plan: Discussed healthy diet and exercise guidelines    He reports that he has never smoked. He has never used smokeless tobacco.      Counseling Resources:  ATP IV Guidelines  Pooled Cohorts Equation Calculator  FRAX Risk Assessment  ICSI Preventive Guidelines  Dietary Guidelines for Americans, 2010  USDA's MyPlate  ASA Prophylaxis  Lung CA Screening    KWADWO Baltazar [facility]

## 2022-05-12 PROBLEM — D04.30 SQUAMOUS CELL CARCINOMA IN SITU OF SKIN OF FACE: Status: ACTIVE | Noted: 2022-05-12

## 2022-07-07 DIAGNOSIS — E11.9 TYPE 2 DIABETES MELLITUS WITHOUT COMPLICATION, WITHOUT LONG-TERM CURRENT USE OF INSULIN (H): ICD-10-CM

## 2022-07-07 RX ORDER — DULAGLUTIDE 3 MG/.5ML
INJECTION, SOLUTION SUBCUTANEOUS
Qty: 2 ML | Refills: 1 | Status: SHIPPED | OUTPATIENT
Start: 2022-07-07 | End: 2022-08-11

## 2022-07-07 NOTE — TELEPHONE ENCOUNTER
Routing refill request to provider for review/approval because:  PCP to determine first refill     Ramo Davis RN

## 2022-08-11 ENCOUNTER — TELEPHONE (OUTPATIENT)
Dept: FAMILY MEDICINE | Facility: CLINIC | Age: 52
End: 2022-08-11

## 2022-08-11 ENCOUNTER — MYC MEDICAL ADVICE (OUTPATIENT)
Dept: FAMILY MEDICINE | Facility: CLINIC | Age: 52
End: 2022-08-11

## 2022-08-11 DIAGNOSIS — E11.65 TYPE 2 DIABETES MELLITUS WITH HYPERGLYCEMIA, WITHOUT LONG-TERM CURRENT USE OF INSULIN (H): Primary | ICD-10-CM

## 2022-08-11 DIAGNOSIS — E11.9 TYPE 2 DIABETES MELLITUS WITHOUT COMPLICATION, WITHOUT LONG-TERM CURRENT USE OF INSULIN (H): ICD-10-CM

## 2022-08-11 RX ORDER — DULAGLUTIDE 4.5 MG/.5ML
4.5 INJECTION, SOLUTION SUBCUTANEOUS WEEKLY
Qty: 12 ML | Refills: 0 | Status: SHIPPED | OUTPATIENT
Start: 2022-08-11 | End: 2022-12-29

## 2022-08-11 RX ORDER — DULAGLUTIDE 3 MG/.5ML
4.5 INJECTION, SOLUTION SUBCUTANEOUS WEEKLY
Qty: 12 ML | Refills: 0 | Status: SHIPPED | OUTPATIENT
Start: 2022-08-11 | End: 2022-08-11

## 2022-08-11 NOTE — TELEPHONE ENCOUNTER
Please fix order for trulicity- injector comes as a 4.5mg so if that's the dose you'd like please send order for the strength.     Thank you  [facility]   [phone]

## 2022-10-15 ENCOUNTER — HEALTH MAINTENANCE LETTER (OUTPATIENT)
Age: 52
End: 2022-10-15

## 2022-12-03 ENCOUNTER — HEALTH MAINTENANCE LETTER (OUTPATIENT)
Age: 52
End: 2022-12-03

## 2022-12-13 LAB — NONINV COLON CA DNA+OCC BLD SCRN STL QL: NEGATIVE

## 2023-01-25 ENCOUNTER — OFFICE VISIT (OUTPATIENT)
Dept: FAMILY MEDICINE | Facility: CLINIC | Age: 53
End: 2023-01-25
Payer: COMMERCIAL

## 2023-01-25 VITALS
OXYGEN SATURATION: 97 % | HEART RATE: 72 BPM | TEMPERATURE: 98.1 F | BODY MASS INDEX: 33.72 KG/M2 | WEIGHT: 249 LBS | RESPIRATION RATE: 16 BRPM | SYSTOLIC BLOOD PRESSURE: 128 MMHG | DIASTOLIC BLOOD PRESSURE: 76 MMHG | HEIGHT: 72 IN

## 2023-01-25 DIAGNOSIS — E66.01 MORBID OBESITY (H): ICD-10-CM

## 2023-01-25 DIAGNOSIS — I10 BENIGN ESSENTIAL HYPERTENSION: ICD-10-CM

## 2023-01-25 DIAGNOSIS — D04.30 SQUAMOUS CELL CARCINOMA IN SITU OF SKIN OF FACE: ICD-10-CM

## 2023-01-25 DIAGNOSIS — E11.65 TYPE 2 DIABETES MELLITUS WITH HYPERGLYCEMIA, WITHOUT LONG-TERM CURRENT USE OF INSULIN (H): Primary | ICD-10-CM

## 2023-01-25 DIAGNOSIS — Z80.42 FAMILY HISTORY OF PROSTATE CANCER: ICD-10-CM

## 2023-01-25 DIAGNOSIS — I20.0 UNSTABLE ANGINA (H): ICD-10-CM

## 2023-01-25 DIAGNOSIS — Z12.5 SCREENING PSA (PROSTATE SPECIFIC ANTIGEN): ICD-10-CM

## 2023-01-25 DIAGNOSIS — E78.5 HYPERLIPIDEMIA LDL GOAL <100: ICD-10-CM

## 2023-01-25 DIAGNOSIS — R97.20 ELEVATED PROSTATE SPECIFIC ANTIGEN (PSA): ICD-10-CM

## 2023-01-25 DIAGNOSIS — F31.9 BIPOLAR I DISORDER (H): ICD-10-CM

## 2023-01-25 LAB
ANION GAP SERPL CALCULATED.3IONS-SCNC: 12 MMOL/L (ref 7–15)
BUN SERPL-MCNC: 13.7 MG/DL (ref 6–20)
CALCIUM SERPL-MCNC: 9.3 MG/DL (ref 8.6–10)
CHLORIDE SERPL-SCNC: 101 MMOL/L (ref 98–107)
CHOLEST SERPL-MCNC: 207 MG/DL
CREAT SERPL-MCNC: 0.86 MG/DL (ref 0.67–1.17)
CREAT UR-MCNC: 62.4 MG/DL
DEPRECATED HCO3 PLAS-SCNC: 24 MMOL/L (ref 22–29)
GFR SERPL CREATININE-BSD FRML MDRD: >90 ML/MIN/1.73M2
GLUCOSE SERPL-MCNC: 264 MG/DL (ref 70–99)
HBA1C MFR BLD: 8.5 % (ref 0–5.6)
HDLC SERPL-MCNC: 42 MG/DL
LDLC SERPL CALC-MCNC: 130 MG/DL
MICROALBUMIN UR-MCNC: <12 MG/L
MICROALBUMIN/CREAT UR: NORMAL MG/G{CREAT}
NONHDLC SERPL-MCNC: 165 MG/DL
POTASSIUM SERPL-SCNC: 4.2 MMOL/L (ref 3.4–5.3)
PSA SERPL-MCNC: 5.4 NG/ML (ref 0–3.5)
SODIUM SERPL-SCNC: 137 MMOL/L (ref 136–145)
TRIGL SERPL-MCNC: 176 MG/DL

## 2023-01-25 PROCEDURE — 90715 TDAP VACCINE 7 YRS/> IM: CPT | Performed by: PHYSICIAN ASSISTANT

## 2023-01-25 PROCEDURE — 82043 UR ALBUMIN QUANTITATIVE: CPT | Performed by: PHYSICIAN ASSISTANT

## 2023-01-25 PROCEDURE — G0103 PSA SCREENING: HCPCS | Performed by: PHYSICIAN ASSISTANT

## 2023-01-25 PROCEDURE — 36415 COLL VENOUS BLD VENIPUNCTURE: CPT | Performed by: PHYSICIAN ASSISTANT

## 2023-01-25 PROCEDURE — 83036 HEMOGLOBIN GLYCOSYLATED A1C: CPT | Performed by: PHYSICIAN ASSISTANT

## 2023-01-25 PROCEDURE — 99207 PR FOOT EXAM NO CHARGE: CPT | Performed by: PHYSICIAN ASSISTANT

## 2023-01-25 PROCEDURE — 82570 ASSAY OF URINE CREATININE: CPT | Performed by: PHYSICIAN ASSISTANT

## 2023-01-25 PROCEDURE — 99214 OFFICE O/P EST MOD 30 MIN: CPT | Mod: 25 | Performed by: PHYSICIAN ASSISTANT

## 2023-01-25 PROCEDURE — 80061 LIPID PANEL: CPT | Performed by: PHYSICIAN ASSISTANT

## 2023-01-25 PROCEDURE — 80048 BASIC METABOLIC PNL TOTAL CA: CPT | Performed by: PHYSICIAN ASSISTANT

## 2023-01-25 PROCEDURE — 90471 IMMUNIZATION ADMIN: CPT | Performed by: PHYSICIAN ASSISTANT

## 2023-01-25 RX ORDER — DULAGLUTIDE 4.5 MG/.5ML
4.5 INJECTION, SOLUTION SUBCUTANEOUS WEEKLY
Qty: 6 ML | Refills: 1 | Status: SHIPPED | OUTPATIENT
Start: 2023-01-25 | End: 2023-07-17

## 2023-01-25 RX ORDER — LOSARTAN POTASSIUM 50 MG/1
50 TABLET ORAL DAILY
Qty: 90 TABLET | Refills: 1 | Status: SHIPPED | OUTPATIENT
Start: 2023-01-25 | End: 2023-07-17

## 2023-01-25 RX ORDER — DULAGLUTIDE 4.5 MG/.5ML
INJECTION, SOLUTION SUBCUTANEOUS
Qty: 2 ML | Refills: 0 | Status: CANCELLED | OUTPATIENT
Start: 2023-01-25

## 2023-01-25 RX ORDER — ROSUVASTATIN CALCIUM 20 MG/1
20 TABLET, COATED ORAL DAILY
Qty: 90 TABLET | Refills: 1 | Status: SHIPPED | OUTPATIENT
Start: 2023-01-25 | End: 2023-01-26

## 2023-01-25 NOTE — PATIENT INSTRUCTIONS
Continue current medications  Start Jardiance  Call insurance for coverage of continuous glucose monitor, [facility] pharmacy referral (medication therapy management) if needed    Repeat A1C in 3 months, follow up in clinic 6 months
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
## 2023-01-26 RX ORDER — ROSUVASTATIN CALCIUM 40 MG/1
40 TABLET, COATED ORAL DAILY
Qty: 90 TABLET | Refills: 3 | Status: SHIPPED | OUTPATIENT
Start: 2023-01-26

## 2023-02-28 DIAGNOSIS — E11.65 TYPE 2 DIABETES MELLITUS WITH HYPERGLYCEMIA, WITHOUT LONG-TERM CURRENT USE OF INSULIN (H): ICD-10-CM

## 2023-03-02 ENCOUNTER — TRANSFERRED RECORDS (OUTPATIENT)
Dept: HEALTH INFORMATION MANAGEMENT | Facility: CLINIC | Age: 53
End: 2023-03-02
Payer: COMMERCIAL

## 2023-03-02 RX ORDER — DULAGLUTIDE 4.5 MG/.5ML
INJECTION, SOLUTION SUBCUTANEOUS
Qty: 2 ML | Refills: 0 | Status: SHIPPED | OUTPATIENT
Start: 2023-03-02 | End: 2023-04-26

## 2023-03-02 NOTE — TELEPHONE ENCOUNTER
"Last Written Prescription Date: 12/29/22  Last Fill Quantity: 2, # refills: 0  Last office visit provider: 1/25/23        Requested Prescriptions   Pending Prescriptions Disp Refills     TRULICITY 4.5 MG/0.5ML SOPN [Pharmacy Med Name: Trulicity 4.5 mg/0.5 mL subcutaneous pen injector] 2 mL 0     Sig: INJECT 4.5mg subcutaneous every week       GLP-1 Agonists Protocol Passed - 2/28/2023  8:13 AM        Passed - HgbA1C in past 3 or 6 months     If HgbA1C is 8 or greater, it needs to be on file within the past 3 months.  If less than 8, must be on file within the past 6 months.     Recent Labs   Lab Test 01/25/23  1503   A1C 8.5*             Passed - Medication is active on med list        Passed - Patient is age 18 or older        Passed - Normal serum creatinine on file in past 12 months     Recent Labs   Lab Test 01/25/23  1503   CR 0.86       Ok to refill medication if creatinine is low          Passed - Recent (6 mo) or future (30 days) visit within the authorizing provider's specialty     Patient had office visit in the last 6 months or has a visit in the next 30 days with authorizing provider.  See \"Patient Info\" tab in inbasket, or \"Choose Columns\" in Meds & Orders section of the refill encounter.                     Micheline Agudelo RN 03/02/23 11:08 AM  "

## 2023-04-17 ENCOUNTER — TRANSFERRED RECORDS (OUTPATIENT)
Dept: HEALTH INFORMATION MANAGEMENT | Facility: CLINIC | Age: 53
End: 2023-04-17
Payer: COMMERCIAL

## 2023-04-20 ENCOUNTER — PATIENT OUTREACH (OUTPATIENT)
Dept: CARE COORDINATION | Facility: CLINIC | Age: 53
End: 2023-04-20
Payer: COMMERCIAL

## 2023-05-04 ENCOUNTER — PATIENT OUTREACH (OUTPATIENT)
Dept: CARE COORDINATION | Facility: CLINIC | Age: 53
End: 2023-05-04
Payer: COMMERCIAL

## 2023-05-04 ENCOUNTER — TRANSFERRED RECORDS (OUTPATIENT)
Dept: HEALTH INFORMATION MANAGEMENT | Facility: CLINIC | Age: 53
End: 2023-05-04
Payer: COMMERCIAL

## 2023-06-11 ENCOUNTER — HEALTH MAINTENANCE LETTER (OUTPATIENT)
Age: 53
End: 2023-06-11

## 2023-06-22 DIAGNOSIS — E11.65 TYPE 2 DIABETES MELLITUS WITH HYPERGLYCEMIA, WITHOUT LONG-TERM CURRENT USE OF INSULIN (H): ICD-10-CM

## 2023-06-22 RX ORDER — DULAGLUTIDE 4.5 MG/.5ML
INJECTION, SOLUTION SUBCUTANEOUS
Qty: 2 ML | Refills: 0 | Status: SHIPPED | OUTPATIENT
Start: 2023-06-22 | End: 2023-07-17

## 2023-06-22 NOTE — TELEPHONE ENCOUNTER
"Routing refill request to provider for review/approval because:  Labs not current:  A1C      Requested Prescriptions   Pending Prescriptions Disp Refills     TRULICITY 4.5 MG/0.5ML SOPN [Pharmacy Med Name: Trulicity 4.5 mg/0.5 mL subcutaneous pen injector] 2 mL 0     Si.5 inject ONCE APPLY WEEK       GLP-1 Agonists Protocol Failed - 2023  7:36 AM        Failed - HgbA1C in past 3 or 6 months     If HgbA1C is 8 or greater, it needs to be on file within the past 3 months.  If less than 8, must be on file within the past 6 months.     Recent Labs   Lab Test 23  1503   A1C 8.5*             Passed - Medication is active on med list        Passed - Patient is age 18 or older        Passed - Normal serum creatinine on file in past 12 months     Recent Labs   Lab Test 23  1503   CR 0.86       Ok to refill medication if creatinine is low          Passed - Recent (6 mo) or future (30 days) visit within the authorizing provider's specialty     Patient had office visit in the last 6 months or has a visit in the next 30 days with authorizing provider.  See \"Patient Info\" tab in inbasket, or \"Choose Columns\" in Meds & Orders section of the refill encounter.                     Ramo Davis RN 23 12:29 PM  "

## 2023-07-17 ENCOUNTER — OFFICE VISIT (OUTPATIENT)
Dept: FAMILY MEDICINE | Facility: CLINIC | Age: 53
End: 2023-07-17
Payer: COMMERCIAL

## 2023-07-17 VITALS
OXYGEN SATURATION: 96 % | RESPIRATION RATE: 16 BRPM | HEIGHT: 72 IN | DIASTOLIC BLOOD PRESSURE: 78 MMHG | HEART RATE: 73 BPM | WEIGHT: 250.1 LBS | SYSTOLIC BLOOD PRESSURE: 134 MMHG | TEMPERATURE: 97.9 F | BODY MASS INDEX: 33.87 KG/M2

## 2023-07-17 DIAGNOSIS — F43.23 ADJUSTMENT DISORDER WITH MIXED ANXIETY AND DEPRESSED MOOD: ICD-10-CM

## 2023-07-17 DIAGNOSIS — Z80.42 FAMILY HISTORY OF PROSTATE CANCER: ICD-10-CM

## 2023-07-17 DIAGNOSIS — Z00.00 ENCOUNTER FOR ROUTINE ADULT HEALTH EXAMINATION WITHOUT ABNORMAL FINDINGS: Primary | ICD-10-CM

## 2023-07-17 DIAGNOSIS — F31.9 BIPOLAR I DISORDER (H): ICD-10-CM

## 2023-07-17 DIAGNOSIS — E78.5 HYPERLIPIDEMIA LDL GOAL <100: ICD-10-CM

## 2023-07-17 DIAGNOSIS — I10 BENIGN ESSENTIAL HYPERTENSION: ICD-10-CM

## 2023-07-17 DIAGNOSIS — I20.0 UNSTABLE ANGINA (H): ICD-10-CM

## 2023-07-17 DIAGNOSIS — E11.65 TYPE 2 DIABETES MELLITUS WITH HYPERGLYCEMIA, WITHOUT LONG-TERM CURRENT USE OF INSULIN (H): ICD-10-CM

## 2023-07-17 DIAGNOSIS — D04.30 SQUAMOUS CELL CARCINOMA IN SITU OF SKIN OF FACE: ICD-10-CM

## 2023-07-17 DIAGNOSIS — R97.20 ELEVATED PROSTATE SPECIFIC ANTIGEN (PSA): ICD-10-CM

## 2023-07-17 DIAGNOSIS — E66.01 MORBID OBESITY (H): ICD-10-CM

## 2023-07-17 LAB
ALT SERPL W P-5'-P-CCNC: 35 U/L (ref 0–70)
CHOLEST SERPL-MCNC: 213 MG/DL
HBA1C MFR BLD: 8.9 % (ref 0–5.6)
HDLC SERPL-MCNC: 41 MG/DL
LDLC SERPL CALC-MCNC: 152 MG/DL
NONHDLC SERPL-MCNC: 172 MG/DL
PSA SERPL DL<=0.01 NG/ML-MCNC: 2.71 NG/ML (ref 0–3.5)
TRIGL SERPL-MCNC: 101 MG/DL

## 2023-07-17 PROCEDURE — 99213 OFFICE O/P EST LOW 20 MIN: CPT | Mod: 25 | Performed by: PHYSICIAN ASSISTANT

## 2023-07-17 PROCEDURE — 83036 HEMOGLOBIN GLYCOSYLATED A1C: CPT | Performed by: PHYSICIAN ASSISTANT

## 2023-07-17 PROCEDURE — 80061 LIPID PANEL: CPT | Performed by: PHYSICIAN ASSISTANT

## 2023-07-17 PROCEDURE — G0103 PSA SCREENING: HCPCS | Performed by: PHYSICIAN ASSISTANT

## 2023-07-17 PROCEDURE — 36415 COLL VENOUS BLD VENIPUNCTURE: CPT | Performed by: PHYSICIAN ASSISTANT

## 2023-07-17 PROCEDURE — 84460 ALANINE AMINO (ALT) (SGPT): CPT | Performed by: PHYSICIAN ASSISTANT

## 2023-07-17 PROCEDURE — 99396 PREV VISIT EST AGE 40-64: CPT | Performed by: PHYSICIAN ASSISTANT

## 2023-07-17 RX ORDER — LOSARTAN POTASSIUM 50 MG/1
50 TABLET ORAL DAILY
Qty: 90 TABLET | Refills: 3 | Status: SHIPPED | OUTPATIENT
Start: 2023-07-17 | End: 2024-04-17

## 2023-07-17 RX ORDER — SILDENAFIL 100 MG/1
TABLET, FILM COATED ORAL
COMMUNITY
Start: 2023-03-02 | End: 2023-07-17

## 2023-07-17 RX ORDER — DAPAGLIFLOZIN 5 MG/1
5 TABLET, FILM COATED ORAL DAILY
Qty: 90 TABLET | Refills: 1 | Status: SHIPPED | OUTPATIENT
Start: 2023-07-17 | End: 2024-04-17 | Stop reason: SINTOL

## 2023-07-17 RX ORDER — DULAGLUTIDE 4.5 MG/.5ML
4.5 INJECTION, SOLUTION SUBCUTANEOUS WEEKLY
Qty: 6 ML | Refills: 3 | Status: SHIPPED | OUTPATIENT
Start: 2023-07-17 | End: 2024-06-03

## 2023-07-17 RX ORDER — TADALAFIL 20 MG/1
TABLET ORAL
COMMUNITY
Start: 2023-03-22 | End: 2024-04-17

## 2023-07-17 ASSESSMENT — ENCOUNTER SYMPTOMS
ARTHRALGIAS: 0
SHORTNESS OF BREATH: 0
HEMATURIA: 0
FEVER: 0
NERVOUS/ANXIOUS: 0
HEADACHES: 0
DYSURIA: 0
WEAKNESS: 0
SORE THROAT: 0
PARESTHESIAS: 0
HEMATOCHEZIA: 0
PALPITATIONS: 0
NAUSEA: 0
DIZZINESS: 0
CHILLS: 0
ABDOMINAL PAIN: 0
DIARRHEA: 0
JOINT SWELLING: 0
CONSTIPATION: 0
MYALGIAS: 0
COUGH: 0
EYE PAIN: 0
FREQUENCY: 0
HEARTBURN: 0

## 2023-07-17 NOTE — PROGRESS NOTES
SUBJECTIVE:   CC: Karan is an 53 year old who presents for preventative health visit.       7/17/2023    10:51 AM   Additional Questions   Roomed by Magdalena     Healthy Habits:     Getting at least 3 servings of Calcium per day:  Yes    Bi-annual eye exam:  Yes    Dental care twice a year:  Yes    Sleep apnea or symptoms of sleep apnea:  None    Diet:  Low salt    Frequency of exercise:  4-5 days/week    Duration of exercise:  30-45 minutes    Taking medications regularly:  Yes    Medication side effects:  None    Additional concerns today:  No    - diabetes: visit in January started Jardiance. He stopped due to side effect of nausea/upset stomach (took 1.5 weeks)  Has been on metformin, victoza, bydureon - stopped due to GI upset  Increased trulicity in May 2022. On high dose 4.5   Planning to start krill oil, helped his dad's blood sugars    - hypertension: losartan 50 mg.    - recheck PSA: mildly elevated in January  He saw urologist. MRI showed no cancer.  Will recheck PSA this fall.    * history of angina diagnosis: seen in ER 2018 for chest pain, had angiogram which showed:   No significant CAD, very mild disease in proximal LAD  Asymptomatic    * history of SCC/MOHS: [facility].     - bilateral knee osteoarthritis: yearly cartilage injections    Today's PHQ-2 Score:       7/17/2023    10:45 AM   PHQ-2 ( 1999 [employer])   Q1: Little interest or pleasure in doing things 0   Q2: Feeling down, depressed or hopeless 0   PHQ-2 Score 0   Q1: Little interest or pleasure in doing things Not at all   Q2: Feeling down, depressed or hopeless Not at all   PHQ-2 Score 0       Social History     Tobacco Use     Smoking status: Never     Smokeless tobacco: Never   Substance Use Topics     Alcohol use: Yes     Comment: occasional             7/17/2023    10:45 AM   Alcohol Use   Prescreen: >3 drinks/day or >7 drinks/week? Not Applicable          No data to display                Last PSA:   PSA   Date Value Ref Range  Status   09/26/2018 1.92 0 - 4 ug/L Final     Comment:     Assay Method:  Chemiluminescence using Siemens Vista analyzer     Prostate Specific Antigen Screen   Date Value Ref Range Status   01/25/2023 5.40 (H) 0.00 - 3.50 ng/mL Final   05/11/2022 2.33 0.00 - 4.00 ug/L Final       Reviewed orders with patient. Reviewed health maintenance and updated orders accordingly - Yes  Lab work is in process  Labs reviewed in EPIC  BP Readings from Last 3 Encounters:   07/17/23 134/78   01/25/23 128/76   05/11/22 134/84    Wt Readings from Last 3 Encounters:   07/17/23 113.4 kg (250 lb 1.6 oz)   01/25/23 112.9 kg (249 lb)   05/11/22 113.9 kg (251 lb 1.6 oz)                  Patient Active Problem List   Diagnosis     Family history of prostate cancer     Adjustment disorder with mixed anxiety and depressed mood     Bipolar I disorder (H)     Type 2 diabetes mellitus with hyperglycemia, without long-term current use of insulin (H)     Obesity (BMI 35.0-39.9) with comorbidity (H)     Unstable angina (H)     Benign essential hypertension     Hyperlipidemia LDL goal <100     Squamous cell carcinoma in situ of skin of face     Past Surgical History:   Procedure Laterality Date     CV CORONARY ANGIOGRAM N/A 10/06/2018    Procedure: Coronary Angiogram;  Surgeon: Efrem Enriquez MD;  Location: [facility] Cath Lab;  Service:      CV LEFT HEART CATHETERIZATION WITH LEFT VENTRICULOGRAM N/A 10/06/2018    Procedure: Left Heart Catheterization with Left Ventriculogram;  Surgeon: Efrem Enriquez MD;  Location: [facility] Cath Lab;  Service:        Social History     Tobacco Use     Smoking status: Never     Smokeless tobacco: Never   Substance Use Topics     Alcohol use: Yes     Comment: occasional     Family History   Problem Relation Age of Onset     C.A.D. Father 58        nonfatal MI x 2     Prostate Cancer Father 59     Diabetes Father         type 1     Coronary Artery Disease Father      Heart Disease Father      Myocardial  "Infarction Brother 50     Prostate Cancer Maternal Grandfather 69     Prostate Cancer Maternal Uncle 48     Colon Cancer No family hx of            Reviewed and updated as needed this visit by clinical staff   Tobacco  Allergies  Meds  Problems  Med Hx  Surg Hx  Fam Hx  Soc   Hx        Reviewed and updated as needed this visit by Provider   Tobacco  Allergies  Meds  Problems  Med Hx  Surg Hx  Fam Hx         Past Medical History:   Diagnosis Date     NO ACTIVE PROBLEMS       Past Surgical History:   Procedure Laterality Date     CV CORONARY ANGIOGRAM N/A 10/06/2018    Procedure: Coronary Angiogram;  Surgeon: Efrem Enriquez MD;  Location: [facility] Cath Lab;  Service:      CV LEFT HEART CATHETERIZATION WITH LEFT VENTRICULOGRAM N/A 10/06/2018    Procedure: Left Heart Catheterization with Left Ventriculogram;  Surgeon: Efrem Enriquez MD;  Location: [facility] Cath Lab;  Service:        Review of Systems   Constitutional: Negative for chills and fever.   HENT: Negative for congestion, ear pain, hearing loss and sore throat.    Eyes: Negative for pain and visual disturbance.   Respiratory: Negative for cough and shortness of breath.    Cardiovascular: Negative for chest pain, palpitations and peripheral edema.   Gastrointestinal: Negative for abdominal pain, constipation, diarrhea, heartburn, hematochezia and nausea.   Genitourinary: Negative for dysuria, frequency, genital sores, hematuria, impotence, penile discharge and urgency.   Musculoskeletal: Negative for arthralgias, joint swelling and myalgias.   Skin: Negative for rash.   Neurological: Negative for dizziness, weakness, headaches and paresthesias.   Psychiatric/Behavioral: Negative for mood changes. The patient is not nervous/anxious.      OBJECTIVE:   /78   Pulse 73   Temp 97.9  F (36.6  C) (Tympanic)   Resp 16   Ht 1.829 m (6' 0.01\")   Wt 113.4 kg (250 lb 1.6 oz)   SpO2 96%   BMI 33.91 kg/m      Physical Exam  GENERAL: " healthy, alert and no distress  EYES: Eyes grossly normal to inspection, PERRL and conjunctivae and sclerae normal  HENT: ear canals and TM's normal, nose and mouth without ulcers or lesions  NECK: no adenopathy, no asymmetry, masses, or scars and thyroid normal to palpation  RESP: lungs clear to auscultation - no rales, rhonchi or wheezes  CV: regular rate and rhythm, normal S1 S2, no S3 or S4, no murmur, click or rub, no peripheral edema and peripheral pulses strong  ABDOMEN: soft, nontender, no hepatosplenomegaly, no masses and bowel sounds normal  MS: no gross musculoskeletal defects noted, no edema  SKIN: no suspicious lesions or rashes  NEURO: Normal strength and tone, mentation intact and speech normal  BACK: no CVA tenderness, no paralumbar tenderness  PSYCH: mentation appears normal, affect normal/bright  LYMPH: no cervical, supraclavicular, axillary, or inguinal adenopathy    Diagnostic Test Results:  Labs reviewed in Epic    ASSESSMENT/PLAN:     ASSESSMENT/PLAN:      ICD-10-CM    1. Encounter for routine adult health examination without abnormal findings  Z00.00       2. Type 2 diabetes mellitus with hyperglycemia, without long-term current use of insulin (H)  E11.65 Hemoglobin A1c     losartan (COZAAR) 50 MG tablet     Dulaglutide (TRULICITY) 4.5 MG/0.5ML SOPN     dapagliflozin (FARXIGA) 5 MG TABS tablet      3. Elevated prostate specific antigen (PSA)  R97.20 PSA, screen      4. Hyperlipidemia LDL goal <100  E78.5 ALT     Lipid panel reflex to direct LDL Non-fasting     Lipid panel reflex to direct LDL Fasting     CANCELED: Lipid panel reflex to direct LDL Non-fasting      5. Unstable angina (H)  I20.0 Lipid panel reflex to direct LDL Fasting      6. Obesity (BMI 35.0-39.9) with comorbidity (H)  E66.01       7. Bipolar I disorder (H)  F31.9       8. Squamous cell carcinoma in situ of skin of face  D04.30       9. Benign essential hypertension  I10 losartan (COZAAR) 50 MG tablet      10. Adjustment  "disorder with mixed anxiety and depressed mood  F43.23       11. Family history of prostate cancer  Z80.42 PSA, screen        - diabetes: uncontrolled, A1C 8.9. discussed checking blood sugars regulalry. He is willing to trial invokana. No history of UTI or yeast infection. Discussed risks.  If needed, he is willing to retrial jardiance and give more time.  He has had GI side effects with multiple diabetes medications.  Offered diabetes educator referral.  - elevated PSA: patient would like checked today. Continue to follow up with [facility].  - hypertension: well controlled recently per patient. Continue to monitor.  - bipolar disorder/mental health: stable.  - history of angina diagnosis: seen in ER 2018 for chest pain, had angiogram which showed:   No significant CAD, very mild disease in proximal LAD. Asymptomatic  - history of SCC/MOHS: [facility].   - obesity: discussed lifestyle changes      COUNSELING:   Reviewed preventive health counseling, as reflected in patient instructions       Regular exercise       Healthy diet/nutrition       Immunizations    Declined: Covid-19 and Pneumococcal. He is interested in shingles vaccine, will check insurance.               Prostate cancer screening      BMI:   Estimated body mass index is 33.91 kg/m  as calculated from the following:    Height as of this encounter: 1.829 m (6' 0.01\").    Weight as of this encounter: 113.4 kg (250 lb 1.6 oz).   Weight management plan: Discussed healthy diet and exercise guidelines      He reports that he has never smoked. He has never used smokeless tobacco.        KWADWO Baltazar [facility]  "

## 2023-09-19 ENCOUNTER — TRANSFERRED RECORDS (OUTPATIENT)
Dept: HEALTH INFORMATION MANAGEMENT | Facility: CLINIC | Age: 53
End: 2023-09-19

## 2024-01-23 ENCOUNTER — TRANSFERRED RECORDS (OUTPATIENT)
Dept: HEALTH INFORMATION MANAGEMENT | Facility: CLINIC | Age: 54
End: 2024-01-23
Payer: COMMERCIAL

## 2024-03-17 ENCOUNTER — HEALTH MAINTENANCE LETTER (OUTPATIENT)
Age: 54
End: 2024-03-17

## 2024-04-17 ENCOUNTER — OFFICE VISIT (OUTPATIENT)
Dept: FAMILY MEDICINE | Facility: CLINIC | Age: 54
End: 2024-04-17
Attending: PHYSICIAN ASSISTANT
Payer: COMMERCIAL

## 2024-04-17 VITALS
WEIGHT: 246.1 LBS | HEIGHT: 73 IN | OXYGEN SATURATION: 98 % | RESPIRATION RATE: 17 BRPM | TEMPERATURE: 97.5 F | DIASTOLIC BLOOD PRESSURE: 90 MMHG | SYSTOLIC BLOOD PRESSURE: 150 MMHG | BODY MASS INDEX: 32.62 KG/M2 | HEART RATE: 70 BPM

## 2024-04-17 DIAGNOSIS — D04.30 SQUAMOUS CELL CARCINOMA IN SITU OF SKIN OF FACE: ICD-10-CM

## 2024-04-17 DIAGNOSIS — F31.9 BIPOLAR I DISORDER (H): ICD-10-CM

## 2024-04-17 DIAGNOSIS — E78.5 HYPERLIPIDEMIA LDL GOAL <100: ICD-10-CM

## 2024-04-17 DIAGNOSIS — I20.0 UNSTABLE ANGINA (H): ICD-10-CM

## 2024-04-17 DIAGNOSIS — I10 BENIGN ESSENTIAL HYPERTENSION: ICD-10-CM

## 2024-04-17 DIAGNOSIS — E66.01 MORBID OBESITY (H): ICD-10-CM

## 2024-04-17 DIAGNOSIS — E11.65 TYPE 2 DIABETES MELLITUS WITH HYPERGLYCEMIA, WITHOUT LONG-TERM CURRENT USE OF INSULIN (H): Primary | ICD-10-CM

## 2024-04-17 LAB
ANION GAP SERPL CALCULATED.3IONS-SCNC: 9 MMOL/L (ref 7–15)
BUN SERPL-MCNC: 11 MG/DL (ref 6–20)
CALCIUM SERPL-MCNC: 9.1 MG/DL (ref 8.6–10)
CHLORIDE SERPL-SCNC: 102 MMOL/L (ref 98–107)
CHOLEST SERPL-MCNC: 229 MG/DL
CREAT SERPL-MCNC: 0.75 MG/DL (ref 0.67–1.17)
DEPRECATED HCO3 PLAS-SCNC: 27 MMOL/L (ref 22–29)
EGFRCR SERPLBLD CKD-EPI 2021: >90 ML/MIN/1.73M2
FASTING STATUS PATIENT QL REPORTED: NO
GLUCOSE SERPL-MCNC: 273 MG/DL (ref 70–99)
HBA1C MFR BLD: 10.3 % (ref 0–5.6)
HDLC SERPL-MCNC: 43 MG/DL
HOLD SPECIMEN: NORMAL
HOLD SPECIMEN: NORMAL
LDLC SERPL CALC-MCNC: 170 MG/DL
NONHDLC SERPL-MCNC: 186 MG/DL
POTASSIUM SERPL-SCNC: 4.4 MMOL/L (ref 3.4–5.3)
SODIUM SERPL-SCNC: 138 MMOL/L (ref 135–145)
TRIGL SERPL-MCNC: 78 MG/DL

## 2024-04-17 PROCEDURE — 80061 LIPID PANEL: CPT | Performed by: PHYSICIAN ASSISTANT

## 2024-04-17 PROCEDURE — 99214 OFFICE O/P EST MOD 30 MIN: CPT | Performed by: PHYSICIAN ASSISTANT

## 2024-04-17 PROCEDURE — G2211 COMPLEX E/M VISIT ADD ON: HCPCS | Performed by: PHYSICIAN ASSISTANT

## 2024-04-17 PROCEDURE — 83036 HEMOGLOBIN GLYCOSYLATED A1C: CPT | Performed by: PHYSICIAN ASSISTANT

## 2024-04-17 PROCEDURE — 36415 COLL VENOUS BLD VENIPUNCTURE: CPT | Performed by: PHYSICIAN ASSISTANT

## 2024-04-17 PROCEDURE — 80048 BASIC METABOLIC PNL TOTAL CA: CPT | Performed by: PHYSICIAN ASSISTANT

## 2024-04-17 RX ORDER — LOSARTAN POTASSIUM 100 MG/1
100 TABLET ORAL DAILY
Qty: 90 TABLET | Refills: 1 | Status: SHIPPED | OUTPATIENT
Start: 2024-04-17

## 2024-04-17 NOTE — LETTER
April 23, 2024      Karan Josue  [address] [id number]        Dear ,    We are writing to inform you of your test results.    Your LDL cholesterol is quite high. You should be taking your Rosuvastatin as prescribed.    Resulted Orders   BASIC METABOLIC PANEL   Result Value Ref Range    Sodium 138 135 - 145 mmol/L      Comment:      Reference intervals for this test were updated on 09/26/2023 to more accurately reflect our healthy population. There may be differences in the flagging of prior results with similar values performed with this method. Interpretation of those prior results can be made in the context of the updated reference intervals.     Potassium 4.4 3.4 - 5.3 mmol/L    Chloride 102 98 - 107 mmol/L    Carbon Dioxide (CO2) 27 22 - 29 mmol/L    Anion Gap 9 7 - 15 mmol/L    Urea Nitrogen 11.0 6.0 - 20.0 mg/dL    Creatinine 0.75 0.67 - 1.17 mg/dL    GFR Estimate >90 >60 mL/min/1.73m2    Calcium 9.1 8.6 - 10.0 mg/dL    Glucose 273 (H) 70 - 99 mg/dL   Lipid panel reflex to direct LDL Fasting   Result Value Ref Range    Cholesterol 229 (H) <200 mg/dL    Triglycerides 78 <150 mg/dL    Direct Measure HDL 43 >=40 mg/dL    LDL Cholesterol Calculated 170 (H) <=100 mg/dL    Non HDL Cholesterol 186 (H) <130 mg/dL    Patient Fasting > 8hrs? No     Narrative    Cholesterol  Desirable:  <200 mg/dL    Triglycerides  Normal:  Less than 150 mg/dL  Borderline High:  150-199 mg/dL  High:  200-499 mg/dL  Very High:  Greater than or equal to 500 mg/dL    Direct Measure HDL  Female:  Greater than or equal to 50 mg/dL   Male:  Greater than or equal to 40 mg/dL    LDL Cholesterol  Desirable:  <100mg/dL  Above Desirable:  100-129 mg/dL   Borderline High:  130-159 mg/dL   High:  160-189 mg/dL   Very High:  >= 190 mg/dL    Non HDL Cholesterol  Desirable:  130 mg/dL  Above Desirable:  130-159 mg/dL  Borderline High:  160-189 mg/dL  High:  190-219 mg/dL  Very High:  Greater than or equal to 220 mg/dL       If you have  any questions or concerns, please call the clinic at the number listed above.       Sincerely,      Jimena Grider PA-C/  [facility]

## 2024-04-17 NOTE — PROGRESS NOTES
Assessment & Plan     (E11.65) Type 2 diabetes mellitus with hyperglycemia, without long-term current use of insulin (H)  (primary encounter diagnosis)  Comment: HbA1c today 10.3. Stopped Farxiga almost right after starting due to GI upset. Not checking BG unless feeling unwell. Still taking Trulicity which is at max dose. Has previously failed Ozempic, metformin, and Jardiance due to GI upset. Did discuss possible need for insulin. He wants to try another few months of lifestyle and Invokana.   Plan: HEMOGLOBIN A1C, Albumin Random Urine         Quantitative with Creat Ratio, FOOT EXAM, Lipid        panel reflex to direct LDL Fasting        Encouraged to check BG. Start Invokana. Work on diet/exercise. Ordered MTM referral for some direction on possible medications. Other labs pending, will monitor.    (I10) Benign essential hypertension  Comment: Compliant with Losartan. Has previously failed thiazide due to increased urination. Failed amlodipine and lisinopril due to cough. BP is not well controlled.Originally 173/93 and manual at end of visit still elevated at 150/90. Not checking very often at home but does have a wrist cuff.    Plan: BASIC METABOLIC PANEL        Increase Losartan to 100 mg. He will start checking BP more regularly. Work on diet/exercise. Continue to monitor.  Repeat bmp and blood pressure check in 2-3 weeks.    (D04.30) Squamous cell carcinoma in situ of skin of face  Comment: Follows with dermatology annually.   Plan: No new skin concerns. Continue to monitor.     (E78.5) Hyperlipidemia LDL goal <100  Comment: Taking Crestor maximum dose. Last lipid panel in 7/2023 elevated.   Plan: Lipid panel pending, will monitor.     (I20.0) Unstable angina (H)  Comment: Episode in 2018 with mild disease in proximal LAD. No complaints of this today. Father also had first heart attack in 50s.   Plan: Continue to monitor. Work on lowering other risk factors.     Bipolar disorder: he denies any concerns  "with his mental health.    Recommended follow up with [facility] pharmacist and follow up with me in 3 months.    Morbid obesity: BMI  Estimated body mass index is 32.9 kg/m  as calculated from the following:    Height as of this encounter: 1.842 m (6' 0.52\").    Weight as of this encounter: 111.6 kg (246 lb 1.6 oz).   Weight management plan: Discussed healthy diet and exercise guidelines      MEDICATIONS:        - Increase Losartan to 100 mg daily        - Start taking Invokana 100 mg daily        - Continue other medications without change  FUTURE APPOINTMENTS:       - Follow-up visit in 3 months   SELF MONITORING:       - Please check blood glucose readings weekly       - Please check blood pressure readings several times per week  Work on weight loss  The 10-year ASCVD risk score (Anu LESLIE, et al., 2019) is: 20.8%    Values used to calculate the score:      Age: 53 years      Sex: Male      Is Non- : No      Diabetic: Yes      Tobacco smoker: No      Systolic Blood Pressure: 150 mmHg      Is BP treated: Yes      HDL Cholesterol: 43 mg/dL      Total Cholesterol: 229 mg/dL     STOP Bang Score: 4  Wt Readings from Last 4 Encounters:   04/17/24 111.6 kg (246 lb 1.6 oz)   07/17/23 113.4 kg (250 lb 1.6 oz)   01/25/23 112.9 kg (249 lb)   05/11/22 113.9 kg (251 lb 1.6 oz)        Cecily Russo is a 53 year old, presenting for the following health issues:  Diabetes        4/17/2024     9:03 AM   Additional Questions   Roomed by Magdalena   Accompanied by Self     History of Present Illness       Diabetes:   He presents for follow up of diabetes.  He is checking home blood glucose a few times a month.   He checks blood glucose before and after meals.  Blood glucose is never over 200 and never under 70.     He has no concerns regarding his diabetes at this time.   He is not experiencing numbness or burning in feet, excessive thirst, blurry vision, weight changes or redness, sores or blisters on feet.   " "       Follow up on diabetes, no concerns. He will do his labs today. Follow up on medication  Patient here for a follow up on diabetes. He stopped dapagliflozin right away due to stomach upset. He reports not checking BG only if he feels unwell. He wants a CGM to help monitor but was previously denied with insurance. His diet is nothing particular. Drinks diet pop and water. Plays golf 4x a week and that is his exercise. Trying to loose weight but not doing anything particular. His weight usually fluctuates around the same number.   Checks his blood pressures rarely average 130/70. Elevated today and reports high stress with job this morning. 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   Otherwise medication compliant.   He denies snoring or daytime fatigue and is not concerned with risk of MARGUERITE at this time.   No other complaints.     Review of Systems  Constitutional, HEENT, cardiovascular, pulmonary, gi and gu systems are negative, except as otherwise noted.      Objective    BP (!) 150/90 (BP Location: Right arm, Patient Position: Right side, Cuff Size: Adult Regular)   Pulse 70   Temp 97.5  F (36.4  C) (Tympanic)   Resp 17   Ht 1.842 m (6' 0.52\")   Wt 111.6 kg (246 lb 1.6 oz)   SpO2 98%   BMI 32.90 kg/m    Body mass index is 32.9 kg/m .  Physical Exam  Constitutional:       Appearance: He is obese.   HENT:      Right Ear: Tympanic membrane, ear canal and external ear normal.      Left Ear: Tympanic membrane, ear canal and external ear normal.      Nose: Nose normal.      Mouth/Throat:      Mouth: Mucous membranes are moist.      Pharynx: Oropharynx is clear.   Eyes:      Extraocular Movements: Extraocular movements intact.      Conjunctiva/sclera: Conjunctivae normal.      Pupils: Pupils are equal, round, and reactive to light.   Cardiovascular:      Rate and Rhythm: Normal rate and regular rhythm.      Pulses: Normal pulses.      Heart sounds: Normal heart sounds.   Pulmonary:      Effort: " Pulmonary effort is normal.      Breath sounds: Normal breath sounds.   Abdominal:      General: Bowel sounds are normal.      Palpations: Abdomen is soft.      Comments: Rounded    Musculoskeletal:         General: Normal range of motion.      Cervical back: Normal range of motion.   Skin:     General: Skin is warm.      Capillary Refill: Capillary refill takes less than 2 seconds.   Neurological:      Mental Status: He is oriented to person, place, and time.   Psychiatric:         Mood and Affect: Mood normal.         Behavior: Behavior normal.         Thought Content: Thought content normal.         Judgment: Judgment normal.            Results for orders placed or performed in visit on 04/17/24 (from the past 24 hour(s))   HEMOGLOBIN A1C   Result Value Ref Range    Hemoglobin A1C 10.3 (H) 0.0 - 5.6 %   BASIC METABOLIC PANEL   Result Value Ref Range    Sodium 138 135 - 145 mmol/L    Potassium 4.4 3.4 - 5.3 mmol/L    Chloride 102 98 - 107 mmol/L    Carbon Dioxide (CO2) 27 22 - 29 mmol/L    Anion Gap 9 7 - 15 mmol/L    Urea Nitrogen 11.0 6.0 - 20.0 mg/dL    Creatinine 0.75 0.67 - 1.17 mg/dL    GFR Estimate >90 >60 mL/min/1.73m2    Calcium 9.1 8.6 - 10.0 mg/dL    Glucose 273 (H) 70 - 99 mg/dL   Lipid panel reflex to direct LDL Fasting   Result Value Ref Range    Cholesterol 229 (H) <200 mg/dL    Triglycerides 78 <150 mg/dL    Direct Measure HDL 43 >=40 mg/dL    LDL Cholesterol Calculated 170 (H) <=100 mg/dL    Non HDL Cholesterol 186 (H) <130 mg/dL    Patient Fasting > 8hrs? No     Narrative    Cholesterol  Desirable:  <200 mg/dL    Triglycerides  Normal:  Less than 150 mg/dL  Borderline High:  150-199 mg/dL  High:  200-499 mg/dL  Very High:  Greater than or equal to 500 mg/dL    Direct Measure HDL  Female:  Greater than or equal to 50 mg/dL   Male:  Greater than or equal to 40 mg/dL    LDL Cholesterol  Desirable:  <100mg/dL  Above Desirable:  100-129 mg/dL   Borderline High:  130-159 mg/dL   High:  160-189 mg/dL    Very High:  >= 190 mg/dL    Non HDL Cholesterol  Desirable:  130 mg/dL  Above Desirable:  130-159 mg/dL  Borderline High:  160-189 mg/dL  High:  190-219 mg/dL  Very High:  Greater than or equal to 220 mg/dL   Extra Tube    Narrative    The following orders were created for panel order Extra Tube.  Procedure                               Abnormality         Status                     ---------                               -----------         ------                     Extra Blue Top Tube[[id number]]                                                         Extra Red Top Tube[[id number]]                               Final result               Extra Green Top (Lithium...[[id number]]                      Final result               Extra Purple Top Tube[[id number]]                                                         Please view results for these tests on the individual orders.   Extra Red Top Tube   Result Value Ref Range    Hold Specimen JIC    Extra Green Top (Lithium Heparin) Tube   Result Value Ref Range    Hold Specimen JIC        ARMIN Hurt student   Exam and note completed by APRN student Katie Chaudhary with oversight from MARCIAL Mcmullen.    I was present with the student who participated in the service and in the documentation of the note, which I have reviewed and verified.  Tianna Grider PA-C     Signed Electronically by: Jimena Grider PA-C

## 2024-05-08 ENCOUNTER — VIRTUAL VISIT (OUTPATIENT)
Dept: PHARMACY | Facility: CLINIC | Age: 54
End: 2024-05-08
Attending: PHYSICIAN ASSISTANT
Payer: COMMERCIAL

## 2024-05-08 DIAGNOSIS — E78.5 HYPERLIPIDEMIA LDL GOAL <100: ICD-10-CM

## 2024-05-08 DIAGNOSIS — E11.65 TYPE 2 DIABETES MELLITUS WITH HYPERGLYCEMIA, WITHOUT LONG-TERM CURRENT USE OF INSULIN (H): ICD-10-CM

## 2024-05-08 DIAGNOSIS — I10 BENIGN ESSENTIAL HYPERTENSION: Primary | ICD-10-CM

## 2024-05-08 PROCEDURE — 99605 MTMS BY PHARM NP 15 MIN: CPT | Mod: 93 | Performed by: PHARMACIST

## 2024-05-08 PROCEDURE — 99607 MTMS BY PHARM ADDL 15 MIN: CPT | Mod: 93 | Performed by: PHARMACIST

## 2024-05-08 RX ORDER — AMLODIPINE BESYLATE 5 MG/1
5 TABLET ORAL DAILY
Qty: 30 TABLET | Refills: 1 | Status: SHIPPED | OUTPATIENT
Start: 2024-05-08

## 2024-05-08 NOTE — PROGRESS NOTES
Medication Therapy Management (MTM) Encounter    ASSESSMENT:                            Medication Adherence/Access: No issues identified    Type 2 Diabetes:  recommend trying Invokana - start with taking every other day to see if tolerated. Monitor blood sugars once daily (alternate before breakfast and before dinner). Other oral possible options if Invokana not tolerated - low dose Actos and/or glipizide.     Hypertension: Patient previously tried amlodipine - mostly likely muscle aches were from his statin and cough from lisinopril (appears he was on both along with amlodipine). Patient is willing to try lose dose again - will start with amlodipine 5 mg daily - sent prescription to his pharmacy.     Hyperlipidemia: Will hold off on statin for now - once blood pressure is improved, will retry statin - either rosuvastatin 2.5 mg three times a week or low dose pravastatin.     PLAN:                            1. Start Invokana every other day to see if tolerated. Monitor blood sugars once daily (alternate before breakfast and before dinner).     2. Start taking amlodipine 5 mg daily - monitor blood pressures.     3. Will hold off on statin for now - once blood pressure is improved, will retry statin.     Future considerations: other oral diabetes medication options - low dose Actos and/or glipizide. Start statin therapy, either - rosuvastatin 2.5 mg three times a week or low dose pravastatin.    Follow-up: Tuesday, May 28th at 4:00 PM    SUBJECTIVE/OBJECTIVE:                          Karan Josue is a 54 year old male called for an initial visit. He was referred to me from Jimena Grider PA-C.      Reason for visit: Per Referral: Has failed multiple medications due to GI side effects (metformin, Ozempic, jardiance, and dapagliflozin). He stops taking them. A1C progressively worse last 10.3     Allergies/ADRs: Reviewed in chart  Past Medical History: Reviewed in chart  Tobacco: He reports that he has never smoked.  He has never used smokeless tobacco.  Alcohol: rarely - beer once a week is a lot    Medication Adherence/Access: no issues reported    Type 2 Diabetes:    Invokana 100 mg daily   Trulicity 4.5 mg weekly  Aspirin 81mg daily    Patient hasn't started Invokana yet - didn't tolerate Jardiance or Farxiga (GI side effects).    Patient reports tolerating Trulicity well.   Blood sugar monitoring: not checking blood sugars lately     Current diabetes symptoms: none  Eye exam: up to date  Foot exam: up to date    Lab Results   Component Value Date    A1C 10.3 04/17/2024    A1C 8.9 07/17/2023    A1C 8.5 01/25/2023    A1C 9.5 05/11/2022    A1C 8.6 03/01/2021    A1C 7.6 08/05/2020    A1C 9.7 12/21/2018    A1C 10.8 10/06/2018    A1C 10.8 09/26/2018     Hypertension: Patient not currently taking losartan - made him feel lethargic and experienced ED. Previously tried lisinopril - had a cough. Also tried amlodipine listed as having a cough and muscle aches.    Patient does check his blood pressure at home, just sitting on the couch 140/87. Has a stressful job - blood pressures higher if taken mid-morning.     BP Readings from Last 3 Encounters:   04/17/24 (!) 150/90   07/17/23 134/78   01/25/23 128/76     Hyperlipidemia: not currently taking a statin - his legs ache when on a statin.      The 10-year ASCVD risk score (Anu LESLIE, et al., 2019) is: 22.4%    Values used to calculate the score:      Age: 54 years      Sex: Male      Is Non- : No      Diabetic: Yes      Tobacco smoker: No      Systolic Blood Pressure: 150 mmHg      Is BP treated: Yes      HDL Cholesterol: 43 mg/dL      Total Cholesterol: 229 mg/dL    Recent Labs   Lab Test 04/17/24  0935 07/17/23  1107   CHOL 229* 213*   HDL 43 41   * 152*   TRIG 78 101     ----------------      I spent 30 minutes with this patient today. All changes were made via collaborative practice agreement with Jimena Grider PA-C. A copy of the visit note was  provided to the patient's provider(s).    A summary of these recommendations was sent via [employer].    Farida Hinds, PharmD  Medication Therapy Management     Telemedicine Visit Details  Type of service:  Telephone visit  Start Time:  9:30 AM  End Time: 10:00 AM     Medication Therapy Recommendations  Benign essential hypertension    Current Medication: amLODIPine (NORVASC) 5 MG tablet   Rationale: Untreated condition - Needs additional medication therapy - Indication   Recommendation: Start Medication   Status: Accepted per CPA         Type 2 diabetes mellitus with hyperglycemia, without long-term current use of insulin (H)    Current Medication: canagliflozin (INVOKANA) 100 MG tablet   Rationale: Synergistic therapy - Needs additional medication therapy - Indication   Recommendation: Start Medication   Status: Patient Agreed - Adherence/Education

## 2024-05-08 NOTE — PATIENT INSTRUCTIONS
"Recommendations from today's MTM visit:                                                    MTM (medication therapy management) is a service provided by a clinical pharmacist designed to help you get the most of out of your medicines.   Today we reviewed what your medicines are for, how to know if they are working, that your medicines are safe and how to make your medicine regimen as easy as possible.      Karan,    It was a pleasure talking with you! We discussed the followin. Start Invokana every other day to see if tolerated. Monitor blood sugars once daily (alternate before breakfast and before dinner).     2. Start taking amlodipine 5 mg daily - monitor blood pressures.     3. Will hold off on statin for now - once blood pressure is improved, will retry statin.     Follow-up: Tuesday, May 28th at 4:00 PM    It was great speaking with you today.  I value your experience and would be very thankful for your time in providing feedback in our clinic survey. In the next few days, you may receive an email or text message from [employer] with a link to a survey related to your  clinical pharmacist.\"     To schedule another MTM appointment, please call the clinic directly or you may call the MTM scheduling line at [phone] or toll-free at [phone].     My Clinical Pharmacist's contact information:                                                      Please feel free to contact me with any questions or concerns you have.      Keep up the good work!!    Farida Hinds, PharmD  [phone] in clinic on Tuesday and Wednesday          "

## 2024-05-28 ENCOUNTER — TELEPHONE (OUTPATIENT)
Dept: PHARMACY | Facility: CLINIC | Age: 54
End: 2024-05-28
Payer: COMMERCIAL

## 2024-05-28 NOTE — TELEPHONE ENCOUNTER
Called patient two separate times for a scheduled MTM telephone visit. Left a message asking patient to return my call.    Farida Hinds, PharmD  Medication Therapy Management

## 2024-06-03 DIAGNOSIS — E11.65 TYPE 2 DIABETES MELLITUS WITH HYPERGLYCEMIA, WITHOUT LONG-TERM CURRENT USE OF INSULIN (H): ICD-10-CM

## 2024-06-03 RX ORDER — DULAGLUTIDE 4.5 MG/.5ML
4.5 INJECTION, SOLUTION SUBCUTANEOUS WEEKLY
Qty: 2 ML | Refills: 0 | Status: SHIPPED | OUTPATIENT
Start: 2024-06-03 | End: 2024-08-01

## 2024-06-03 NOTE — TELEPHONE ENCOUNTER
GFR Estimate   Date Value Ref Range Status   04/17/2024 >90 >60 mL/min/1.73m2 Final   08/05/2020 >90 >60 mL/min/[1.73_m2] Final     Comment:     Non  GFR Calc  Starting 12/18/2018, serum creatinine based estimated GFR (eGFR) will be   calculated using the Chronic Kidney Disease Epidemiology Collaboration   (CKD-EPI) equation.

## 2024-07-25 ENCOUNTER — MYC MEDICAL ADVICE (OUTPATIENT)
Dept: FAMILY MEDICINE | Facility: CLINIC | Age: 54
End: 2024-07-25
Payer: COMMERCIAL

## 2024-07-25 DIAGNOSIS — E11.65 TYPE 2 DIABETES MELLITUS WITH HYPERGLYCEMIA, WITHOUT LONG-TERM CURRENT USE OF INSULIN (H): Primary | ICD-10-CM

## 2024-08-01 DIAGNOSIS — E11.65 TYPE 2 DIABETES MELLITUS WITH HYPERGLYCEMIA, WITHOUT LONG-TERM CURRENT USE OF INSULIN (H): ICD-10-CM

## 2024-08-01 RX ORDER — DULAGLUTIDE 4.5 MG/.5ML
4.5 INJECTION, SOLUTION SUBCUTANEOUS WEEKLY
Qty: 2 ML | Refills: 0 | Status: SHIPPED | OUTPATIENT
Start: 2024-08-01 | End: 2024-09-06

## 2024-08-06 ENCOUNTER — TELEPHONE (OUTPATIENT)
Dept: FAMILY MEDICINE | Facility: CLINIC | Age: 54
End: 2024-08-06
Payer: COMMERCIAL

## 2024-08-06 NOTE — TELEPHONE ENCOUNTER
Prior Authorization Retail Medication Request    Medication/Dose: Trulicity  Diagnosis and ICD code (if different than what is on RX):    Type 2 diabetes mellitus with hyperglycemia, without long-term current use of insulin (H) [E11.65]        New/renewal/insurance change PA/secondary ins. PA:  Previously Tried and Failed:    Rationale:      Insurance   Covermymeds:  Key: [id number]  Last Name: Joselo  : 1970    Pharmacy Information (if different than what is on RX)  Name:  [facility]  Phone:  [phone]  Fax:[phone]

## 2024-08-07 NOTE — TELEPHONE ENCOUNTER
PA Initiation    Medication: TRULICITY 4.5 MG/0.5ML SC SOPN  Insurance Company: [employer] - Phone [phone] Fax [phone]  Pharmacy Filling the Rx: [facility] - [place], [place] - [address] [place] [address]  Filling Pharmacy Phone: [phone]   Start Date: 8/7/2024

## 2024-08-08 NOTE — TELEPHONE ENCOUNTER
Prior Authorization Approval    Medication: TRULICITY 4.5 MG/0.5ML SC SOPN  Authorization Effective Date: 7/7/2024  Authorization Expiration Date: 8/7/2025  Approved Dose/Quantity: 2ml per 28 days  Reference #: [id number]   Insurance Company: [employer] - Phone [phone] Fax [phone]  Expected CoPay: $    CoPay Card Available:      Financial Assistance Needed: NA  Which Pharmacy is filling the prescription: [facility] - [place], [place] - [address] [place] [address]  Pharmacy Notified: Yes, left voicemail  Patient Notified: Yes, via MyChart

## 2024-08-21 ENCOUNTER — E-VISIT (OUTPATIENT)
Dept: FAMILY MEDICINE | Facility: CLINIC | Age: 54
End: 2024-08-21
Payer: COMMERCIAL

## 2024-08-21 DIAGNOSIS — K04.7 TOOTH INFECTION: Primary | ICD-10-CM

## 2024-08-21 PROCEDURE — 99421 OL DIG E/M SVC 5-10 MIN: CPT | Performed by: PHYSICIAN ASSISTANT

## 2024-08-28 ENCOUNTER — LAB (OUTPATIENT)
Dept: LAB | Facility: CLINIC | Age: 54
End: 2024-08-28
Payer: COMMERCIAL

## 2024-08-28 DIAGNOSIS — E11.65 TYPE 2 DIABETES MELLITUS WITH HYPERGLYCEMIA, WITHOUT LONG-TERM CURRENT USE OF INSULIN (H): ICD-10-CM

## 2024-08-28 DIAGNOSIS — I10 BENIGN ESSENTIAL HYPERTENSION: ICD-10-CM

## 2024-08-28 DIAGNOSIS — E11.65 TYPE 2 DIABETES MELLITUS WITH HYPERGLYCEMIA, WITHOUT LONG-TERM CURRENT USE OF INSULIN (H): Primary | ICD-10-CM

## 2024-08-28 LAB
ANION GAP SERPL CALCULATED.3IONS-SCNC: 11 MMOL/L (ref 7–15)
BUN SERPL-MCNC: 10.9 MG/DL (ref 6–20)
CALCIUM SERPL-MCNC: 9.4 MG/DL (ref 8.8–10.4)
CHLORIDE SERPL-SCNC: 101 MMOL/L (ref 98–107)
CREAT SERPL-MCNC: 0.65 MG/DL (ref 0.67–1.17)
CREAT UR-MCNC: 30.3 MG/DL
EGFRCR SERPLBLD CKD-EPI 2021: >90 ML/MIN/1.73M2
GLUCOSE SERPL-MCNC: 216 MG/DL (ref 70–99)
HBA1C MFR BLD: 10.7 % (ref 0–5.6)
HCO3 SERPL-SCNC: 25 MMOL/L (ref 22–29)
MICROALBUMIN UR-MCNC: 17 MG/L
MICROALBUMIN/CREAT UR: 56.11 MG/G CR (ref 0–17)
POTASSIUM SERPL-SCNC: 4.3 MMOL/L (ref 3.4–5.3)
SODIUM SERPL-SCNC: 137 MMOL/L (ref 135–145)

## 2024-08-28 PROCEDURE — 82570 ASSAY OF URINE CREATININE: CPT

## 2024-08-28 PROCEDURE — 80048 BASIC METABOLIC PNL TOTAL CA: CPT

## 2024-08-28 PROCEDURE — 83036 HEMOGLOBIN GLYCOSYLATED A1C: CPT

## 2024-08-28 PROCEDURE — 36415 COLL VENOUS BLD VENIPUNCTURE: CPT

## 2024-08-28 PROCEDURE — 82043 UR ALBUMIN QUANTITATIVE: CPT

## 2024-09-06 DIAGNOSIS — E11.65 TYPE 2 DIABETES MELLITUS WITH HYPERGLYCEMIA, WITHOUT LONG-TERM CURRENT USE OF INSULIN (H): ICD-10-CM

## 2024-09-06 RX ORDER — DULAGLUTIDE 4.5 MG/.5ML
4.5 INJECTION, SOLUTION SUBCUTANEOUS WEEKLY
Qty: 2 ML | Refills: 0 | Status: SHIPPED | OUTPATIENT
Start: 2024-09-06 | End: 2024-09-17

## 2024-09-17 DIAGNOSIS — E11.65 TYPE 2 DIABETES MELLITUS WITH HYPERGLYCEMIA, WITHOUT LONG-TERM CURRENT USE OF INSULIN (H): ICD-10-CM

## 2024-09-17 RX ORDER — DULAGLUTIDE 4.5 MG/.5ML
4.5 INJECTION, SOLUTION SUBCUTANEOUS WEEKLY
Qty: 2 ML | Refills: 0 | Status: SHIPPED | OUTPATIENT
Start: 2024-09-17

## 2024-10-08 ENCOUNTER — E-VISIT (OUTPATIENT)
Dept: URGENT CARE | Facility: CLINIC | Age: 54
End: 2024-10-08
Payer: COMMERCIAL

## 2024-10-08 DIAGNOSIS — R60.0 LIP EDEMA: Primary | ICD-10-CM

## 2024-10-08 PROCEDURE — 99207 PR NON-BILLABLE SERV PER CHARTING: CPT | Performed by: EMERGENCY MEDICINE

## 2024-10-08 RX ORDER — PREDNISONE 50 MG/1
50 TABLET ORAL DAILY
Qty: 3 TABLET | Refills: 0 | Status: SHIPPED | OUTPATIENT
Start: 2024-10-08 | End: 2024-10-11

## 2024-10-10 ENCOUNTER — ANCILLARY PROCEDURE (OUTPATIENT)
Dept: ULTRASOUND IMAGING | Facility: HOSPITAL | Age: 54
End: 2024-10-10
Attending: STUDENT IN AN ORGANIZED HEALTH CARE EDUCATION/TRAINING PROGRAM
Payer: COMMERCIAL

## 2024-10-10 ENCOUNTER — HOSPITAL ENCOUNTER (EMERGENCY)
Facility: HOSPITAL | Age: 54
Discharge: HOME | End: 2024-10-10
Attending: STUDENT IN AN ORGANIZED HEALTH CARE EDUCATION/TRAINING PROGRAM | Admitting: STUDENT IN AN ORGANIZED HEALTH CARE EDUCATION/TRAINING PROGRAM
Payer: COMMERCIAL

## 2024-10-10 ENCOUNTER — TELEPHONE (OUTPATIENT)
Dept: OTOLARYNGOLOGY | Facility: CLINIC | Age: 54
End: 2024-10-10
Payer: COMMERCIAL

## 2024-10-10 VITALS
HEART RATE: 79 BPM | HEIGHT: 74 IN | OXYGEN SATURATION: 98 % | DIASTOLIC BLOOD PRESSURE: 97 MMHG | SYSTOLIC BLOOD PRESSURE: 183 MMHG | RESPIRATION RATE: 20 BRPM | WEIGHT: 240.4 LBS | BODY MASS INDEX: 30.85 KG/M2 | TEMPERATURE: 97.7 F

## 2024-10-10 DIAGNOSIS — K13.0 LIP ABSCESS: ICD-10-CM

## 2024-10-10 PROCEDURE — 250N000009 HC RX 250: Performed by: STUDENT IN AN ORGANIZED HEALTH CARE EDUCATION/TRAINING PROGRAM

## 2024-10-10 PROCEDURE — 99284 EMERGENCY DEPT VISIT MOD MDM: CPT | Mod: 25

## 2024-10-10 PROCEDURE — 76536 US EXAM OF HEAD AND NECK: CPT

## 2024-10-10 PROCEDURE — 10060 I&D ABSCESS SIMPLE/SINGLE: CPT

## 2024-10-10 RX ORDER — SULFAMETHOXAZOLE AND TRIMETHOPRIM 800; 160 MG/1; MG/1
1 TABLET ORAL 2 TIMES DAILY
Qty: 14 TABLET | Refills: 0 | Status: SHIPPED | OUTPATIENT
Start: 2024-10-10 | End: 2024-10-17

## 2024-10-10 RX ADMIN — TOPICAL ANESTHETIC 1 ML: 200 SPRAY DENTAL; PERIODONTAL at 04:23

## 2024-10-10 ASSESSMENT — ACTIVITIES OF DAILY LIVING (ADL)
ADLS_ACUITY_SCORE: 35
ADLS_ACUITY_SCORE: 35

## 2024-10-10 ASSESSMENT — COLUMBIA-SUICIDE SEVERITY RATING SCALE - C-SSRS
2. HAVE YOU ACTUALLY HAD ANY THOUGHTS OF KILLING YOURSELF IN THE PAST MONTH?: NO
1. IN THE PAST MONTH, HAVE YOU WISHED YOU WERE DEAD OR WISHED YOU COULD GO TO SLEEP AND NOT WAKE UP?: NO
6. HAVE YOU EVER DONE ANYTHING, STARTED TO DO ANYTHING, OR PREPARED TO DO ANYTHING TO END YOUR LIFE?: NO

## 2024-10-10 NOTE — TELEPHONE ENCOUNTER
"Patient was seen in the ED on 10/9 and today, 10/10 for a lip infection/abscess.    Per ED note: \"On exam patient has evidence of abscess of his lower lip, with fluctuance, induration. Low suspicion of Emory's angina, angioedema based on the presentation and exam. Furthermore, he has not improving with doxycycline, and so he was switched to Bactrim. The abscess was drained as documented below, and he was referred to ear nose and throat for follow-up \"    [place] does not have availability this week either.    1-2 day emergent referral received. Routing to on-call ENT and ENT care coordinator to determine urgency/availability.    Phyllis Sanchez RN on 10/10/2024 at 1:38 PM            "

## 2024-10-10 NOTE — TELEPHONE ENCOUNTER
Lorenzo Torres MD  You; Leatha Escalera, RN; Jaymie Sevilla RN28 minutes ago (1:47 PM)       I can see him before clinic starts at [place] tomorrow.  If not, then before clinic starts on Monday    DY

## 2024-10-10 NOTE — DISCHARGE INSTRUCTIONS
We opened up the infection in your lip in the emergency department with a needle, and we are switching your antibiotic from doxycycline (stop taking) to Bactrim (start taking).  We also referred you to ear nose and throat specialists to follow-up with you in clinic.    Continue to treat any discomfort, swelling with Tylenol, Motrin.    Please return to the ER if your symptoms worsen, if you develop swelling of your top lip or tongue, or if you have difficulty swallowing or breathing.

## 2024-10-10 NOTE — TELEPHONE ENCOUNTER
Per note below, patient prefers [place] location. Forwarding to the correct pool.    Phyllis Sanchez RN on 10/10/2024 at 1:03 PM

## 2024-10-10 NOTE — Clinical Note
Karan Josue was seen and treated in our emergency department on 10/10/2024.  He may return to work on 10/11/2024.       If you have any questions or concerns, please don't hesitate to call.      Shashi Avina MD

## 2024-10-10 NOTE — ED TRIAGE NOTES
Pt reports lip swelling that started on Monday. Pt reports he pulled an ingrown hair out of his beard on Sunday and Monday he noticed swelling. He did a virtual visit and was started on prednisone which didn't help then he was seen at urgent care and was prescribed doxycycline which hasn't helped either. Pt doesn't report any other oral swelling or difficulty breathing.

## 2024-10-10 NOTE — ED PROVIDER NOTES
EMERGENCY DEPARTMENT ENCOUNTER      NAME: Karan Josue  AGE: 54 year old male  YOB: 1970  MRN: [id number]  EVALUATION DATE & TIME: 10/10/2024  3:43 AM    PCP: Jimena Grider    ED PROVIDER: Shashi Avina MD      Chief Complaint   Patient presents with    Oral Swelling         FINAL IMPRESSION:  1. Lip abscess          ED COURSE & MEDICAL DECISION MAKING:    Pertinent Labs & Imaging studies reviewed. (See chart for details)  54 year old male presents to the Emergency Department for evaluation of oral swelling.    On exam patient has evidence of abscess of his lower lip, with fluctuance, induration.  Low suspicion of Emory's angina, angioedema based on the presentation and exam.  Furthermore, he has not improving with doxycycline, and so he was switched to Bactrim.  The abscess was drained as documented below, and he was referred to ear nose and throat for follow-up.  Return precautions provided.         3:55 AM I met with the patient for initial interview and encounter. We discussed a plan for treatment and diagnostic interventions.      Medical Decision Making  Obtained supplemental history:Supplemental history obtained?: No  Reviewed external records: External records reviewed?: Documented in chart and Outpatient Record: [facility] 10/09/2024  Care impacted by chronic illness:Diabetes  Care significantly affected by social determinants of health:Access to Medical Care  Did you consider but not order tests?: Work up considered but not performed and documented in chart, if applicable  Did you interpret images independently?: Independent interpretation of ECG and images noted in documentation, when applicable.  Consultation discussion with other provider:Did you involve another provider (consultant, , pharmacy, etc.)?: No  Discharge. I prescribed additional prescription strength medication(s) as charted. See documentation for any additional details.  Not  "Applicable      At the conclusion of the encounter I discussed the results of all of the tests and the disposition. The questions were answered. The patient or family acknowledged understanding and was agreeable with the care plan.     0 minutes of critical care time     MEDICATIONS GIVEN IN THE EMERGENCY:  Medications   benzocaine 20% (HURRICAINE/TOPEX) 20 % spray 2.5 mL (1 mL Mouth/Throat $Given by Other 10/10/24 5877)       NEW PRESCRIPTIONS STARTED AT TODAY'S ER VISIT  New Prescriptions    SULFAMETHOXAZOLE-TRIMETHOPRIM (BACTRIM DS) 800-160 MG TABLET    Take 1 tablet by mouth 2 times daily for 7 days.          =================================================================    HPI    Patient information was obtained from: the patient     Use of : N/A         Karan Josue is a 54 year old male with a pertinent history of type 2 diabetes, hypertension, and hyperlipidemia who presents to this ED by walking in for evaluation of swollen lips.     The patient reports pulling an ingrown hair out of his beard 3 days ago. He states it was \"like a stain\" initially. 2 days ago, the swelling worsened so he had a virtual visit with his doctor and was prescribed Prednisone without relief. Yesterday he went to urgency room and was prescribed doxycycline. He woke up at 2:00 AM today with swelling on his bottom lip worse.     Medical history of type 2 diabetes. The patient denies tongue swelling, shortness of breath, and any other complaints at this time.       Per chart review, the patient presented to the [facility] on 10/9/2024 (yesterday) for swollen lip. Discharged with doxycycline 100 mg tab.      PAST MEDICAL HISTORY:  Past Medical History:   Diagnosis Date    NO ACTIVE PROBLEMS        PAST SURGICAL HISTORY:  Past Surgical History:   Procedure Laterality Date    CV CORONARY ANGIOGRAM N/A 10/06/2018    Procedure: Coronary Angiogram;  Surgeon: Efrem Enriquez MD;  Location: [facility]" Cath Lab;  Service:     CV LEFT HEART CATHETERIZATION WITH LEFT VENTRICULOGRAM N/A 10/06/2018    Procedure: Left Heart Catheterization with Left Ventriculogram;  Surgeon: Efrem Enriquez MD;  Location: [facility] Cath Lab;  Service:            CURRENT MEDICATIONS:    ACCU-CHEK GUIDE test strip  albuterol (PROAIR HFA/PROVENTIL HFA/VENTOLIN HFA) 108 (90 Base) MCG/ACT inhaler  alcohol swab prep pads  amLODIPine (NORVASC) 5 MG tablet  aspirin 81 MG tablet  blood glucose (NO BRAND SPECIFIED) lancets standard  blood glucose (NO BRAND SPECIFIED) test strip  blood glucose monitoring (NO BRAND SPECIFIED) meter device kit  canagliflozin (INVOKANA) 100 MG tablet  canagliflozin (INVOKANA) 300 MG tablet  Continuous Glucose Monitor KIT  Continuous Glucose Monitor Sup MISC  insulin pen needle (ULTICARE SHORT) 31G X 8 MM miscellaneous  losartan (COZAAR) 100 MG tablet  predniSONE (DELTASONE) 50 MG tablet  rosuvastatin (CRESTOR) 40 MG tablet  sulfamethoxazole-trimethoprim (BACTRIM DS) 800-160 MG tablet  thin (NO BRAND SPECIFIED) lancets  TRULICITY 4.5 MG/0.5ML SOPN        ALLERGIES:  No Known Allergies    FAMILY HISTORY:  Family History   Problem Relation Age of Onset    C.A.D. Father 58        nonfatal MI x 2    Prostate Cancer Father 59    Diabetes Father         type 1    Coronary Artery Disease Father     Heart Disease Father     Myocardial Infarction Brother 50    Prostate Cancer Maternal Grandfather 69    Prostate Cancer Maternal Uncle 48    Colon Cancer No family hx of        SOCIAL HISTORY:   Social History     Socioeconomic History    Marital status:      Spouse name: None    Number of children: None    Years of education: None    Highest education level: None   Tobacco Use    Smoking status: Never    Smokeless tobacco: Never   Vaping Use    Vaping status: Never Used   Substance and Sexual Activity    Alcohol use: Yes     Comment: occasional    Drug use: No    Sexual activity: Yes     Partners: Female  "      VITALS:  BP (!) 183/97   Pulse 79   Temp 97.7  F (36.5  C) (Oral)   Resp 20   Ht 1.88 m (6' 2\")   Wt 109 kg (240 lb 6.4 oz)   SpO2 98%   BMI 30.87 kg/m      PHYSICAL EXAM    Physical Exam  Vitals and nursing note reviewed.   Constitutional:       Appearance: Normal appearance.   HENT:      Head: Normocephalic and atraumatic.      Nose: Nose normal.      Mouth/Throat:      Mouth: Mucous membranes are moist.      Pharynx: Oropharynx is clear.      Comments: Lower lip is diffusely swollen, with fluctuance and induration, predominantly on the right side.  No swelling of the submandibular space, tongue, posterior oropharynx, or upper lip.  Speech is clear.  Handling secretions well  Eyes:      Conjunctiva/sclera: Conjunctivae normal.   Cardiovascular:      Rate and Rhythm: Normal rate.   Pulmonary:      Effort: Pulmonary effort is normal.   Abdominal:      General: Abdomen is flat.   Musculoskeletal:         General: Normal range of motion.      Cervical back: Normal range of motion and neck supple. No rigidity or tenderness.   Neurological:      Mental Status: He is alert and oriented to person, place, and time. Mental status is at baseline.   Psychiatric:         Mood and Affect: Mood normal.         Behavior: Behavior normal.            LAB:  All pertinent labs reviewed and interpreted.  Results for orders placed or performed during the hospital encounter of 10/10/24   POC US SOFT TISSUE    Impression    PROCEDURE: Emergency Department Limited Bedside Screening Ultrasound  TYPE:    ED LIMITED BEDSIDE SCREENING US - SOFT TISSUE  INDICATIONS: Concern for abscess, lower lip swelling  PROCEDURE PROVIDER:   Shashi Avina   WINDOW AND FINDINGS: Multiple small fluid filled pockets scattered throughout lower lip; phlegmon vs abscess  IMAGES SAVED AND STORED FOR ARCHIVE AND REVIEW: Yes            RADIOLOGY:  Reviewed all pertinent imaging. Please see official radiology report.  POC US SOFT TISSUE   ED " Interpretation   PROCEDURE: Emergency Department Limited Bedside Screening Ultrasound  TYPE:    ED LIMITED BEDSIDE SCREENING US - SOFT TISSUE  INDICATIONS: Concern for abscess, lower lip swelling  PROCEDURE PROVIDER:   Shashi Avina   WINDOW AND FINDINGS: Multiple small fluid filled pockets scattered throughout lower lip; phlegmon vs abscess  IMAGES SAVED AND STORED FOR ARCHIVE AND REVIEW: Yes               PROCEDURES:   PROCEDURE: Incision and Drainage   INDICATIONS: Localized abscess   PROCEDURE PROVIDER: Dr Shashi Avina   SITE: Lower lip   MEDICATION: 1ml topical benzocaine spray   NOTE: Local anesthetic was applied topically to the mucosal surface with anesthesia effects demonstrated prior to proceeding.  The area of maximal fluctuance was opened with an 18 gauge needle to allow for drainage.   COMPLEXITY: Simple    Simple = single, furuncle, paronychia, superficial  Complex = multiple or abscess requiring probing, loculations, packing placement   COMPLICATIONS: Patient tolerated procedure well, without complication           [facility] Documentation:   CMS Diagnoses:              I, Christine Ku, am serving as a scribe to document services personally performed by Shashi Avina MD based on my observation and the provider's statements to me. I, Shashi Avina MD, attest that Christine Ku is acting in a scribe capacity, has observed my performance of the services and has documented them in accordance with my direction.    Shashi Avina MD  [facility] EMERGENCY DEPARTMENT  [facility] [facility] [id number] [phone]       Shashi Avina MD  10/10/24 2560

## 2024-10-10 NOTE — TELEPHONE ENCOUNTER
This encounter is being sent to inform the clinic that this patient has a referral from Shashi Avina MD  for the diagnoses of lip abscess and has requested that this patient be seen within 1-2 days.  Based on the availability of our provider(s), we are unable to accommodate this request.    Were all sites offered this patient?  Sending to [facility] for review per patient preference, there was no response in secure chat    Does scheduling algorithm request to schedule next available?  Patient appointment has not been scheduled.  Please review the referral request for accommodation and contact the patient.  If unable to accommodate, please resubmit a referral and indicate a preferred partner or affiliate location using Provider Finder or Scheduling Instructions field.

## 2024-10-11 ENCOUNTER — OFFICE VISIT (OUTPATIENT)
Dept: OTOLARYNGOLOGY | Facility: CLINIC | Age: 54
End: 2024-10-11
Payer: COMMERCIAL

## 2024-10-11 ENCOUNTER — PATIENT OUTREACH (OUTPATIENT)
Dept: CARE COORDINATION | Facility: CLINIC | Age: 54
End: 2024-10-11

## 2024-10-11 VITALS
RESPIRATION RATE: 16 BRPM | DIASTOLIC BLOOD PRESSURE: 91 MMHG | HEART RATE: 74 BPM | OXYGEN SATURATION: 97 % | TEMPERATURE: 97.1 F | SYSTOLIC BLOOD PRESSURE: 158 MMHG

## 2024-10-11 DIAGNOSIS — K13.0 LIP ABSCESS: ICD-10-CM

## 2024-10-11 DIAGNOSIS — L03.211 FACIAL CELLULITIS: Primary | ICD-10-CM

## 2024-10-11 PROCEDURE — 99203 OFFICE O/P NEW LOW 30 MIN: CPT | Performed by: OTOLARYNGOLOGY

## 2024-10-11 RX ORDER — PREDNISONE 10 MG/1
10 TABLET ORAL 2 TIMES DAILY
Qty: 6 TABLET | Refills: 2 | Status: SHIPPED | OUTPATIENT
Start: 2024-10-11 | End: 2024-10-14

## 2024-10-11 RX ORDER — CIPROFLOXACIN 500 MG/1
500 TABLET, FILM COATED ORAL 2 TIMES DAILY
Qty: 20 TABLET | Refills: 0 | Status: SHIPPED | OUTPATIENT
Start: 2024-10-11 | End: 2024-10-21

## 2024-10-11 NOTE — LETTER
10/11/2024      Karan Josue  [address] [address]      Dear Colleague,    Thank you for referring your patient, Karan Josue, to the [facility]. Please see a copy of my visit note below.    History of Present Illness - Karan Josue is a very pleasant 54 year old male here to see me for the first time as an emergency add on due to a lip abscess.    He tells me that he had an ingorwn hair on the edge of his outside of his lip on Monday, and he pulled the hair, and it rapidly worsened.  He was seen in the [facility] in [place], and they placed him on Doxycycline.    To review his records, he was seen just yesterday by Dr Avina and required Needle I and D.  He had been seen previously for this swelling of the lower lip and placed on Doxy, but it did not seem to help.  No airway issues, and able to chew and swallow.    He is currently on Bactrim and a burst of prednisone. He is happy to report things are dramatically better than yesterday since being drained and changing antibiotics        Past Medical History -   Patient Active Problem List   Diagnosis     Family history of prostate cancer     Adjustment disorder with mixed anxiety and depressed mood     Bipolar I disorder (H)     Type 2 diabetes mellitus with hyperglycemia, without long-term current use of insulin (H)     Obesity (BMI 35.0-39.9) with comorbidity (H)     Unstable angina (H)     Benign essential hypertension     Hyperlipidemia LDL goal <100     Squamous cell carcinoma in situ of skin of face       Current Medications -   Current Outpatient Medications:      ACCU-CHEK GUIDE test strip, USE TO TEST THREE TIMES A DAY OR AS DIRECTED, Disp: 100 strip, Rfl: 0     albuterol (PROAIR HFA/PROVENTIL HFA/VENTOLIN HFA) 108 (90 Base) MCG/ACT inhaler, Inhale 2 puffs into the lungs every 6 hours as needed for shortness of breath / dyspnea or wheezing (Patient not taking: Reported on 1/25/2023), Disp: 8.5 g, Rfl: 0      Patient did not want Ibuprofen 800mg , pharmacy put prescription on patients file. alcohol swab prep pads, Use to swab area of injection/rhett as directed., Disp: 100 each, Rfl: 3     amLODIPine (NORVASC) 5 MG tablet, Take 1 tablet (5 mg) by mouth daily, Disp: 30 tablet, Rfl: 1     aspirin 81 MG tablet, Take 1 tablet (81 mg) by mouth daily, Disp: 90 tablet, Rfl: 3     blood glucose (NO BRAND SPECIFIED) lancets standard, Use to test blood sugar 3 times daily or as directed., Disp: 100 each, Rfl: 11     blood glucose (NO BRAND SPECIFIED) test strip, Use to test blood sugars 3 times daily or as directed, Disp: 100 strip, Rfl: 1     blood glucose monitoring (NO BRAND SPECIFIED) meter device kit, Use to test blood sugar 3 times daily or as directed., Disp: 1 kit, Rfl: 0     canagliflozin (INVOKANA) 100 MG tablet, Take 1 tablet (100 mg) by mouth every morning (before breakfast), Disp: 90 tablet, Rfl: 1     canagliflozin (INVOKANA) 300 MG tablet, Take 1 tablet (300 mg) by mouth every morning (before breakfast)., Disp: 90 tablet, Rfl: 0     Continuous Glucose Monitor KIT, 1 Units 3 times daily (Patient not taking: Reported on 7/17/2023), Disp: 1 kit, Rfl: 0     Continuous Glucose Monitor Sup MISC, 1 Units 3 times daily (Patient not taking: Reported on 4/17/2024), Disp: 90 each, Rfl: 3     insulin pen needle (ULTICARE SHORT) 31G X 8 MM miscellaneous, Use 1 pen needles daily or as directed., Disp: 100 each, Rfl: 3     losartan (COZAAR) 100 MG tablet, Take 1 tablet (100 mg) by mouth daily (Patient not taking: Reported on 5/8/2024), Disp: 90 tablet, Rfl: 1     predniSONE (DELTASONE) 50 MG tablet, Take 1 tablet (50 mg) by mouth daily for 3 days., Disp: 3 tablet, Rfl: 0     rosuvastatin (CRESTOR) 40 MG tablet, Take 1 tablet (40 mg) by mouth daily (Patient not taking: Reported on 5/8/2024), Disp: 90 tablet, Rfl: 3     sulfamethoxazole-trimethoprim (BACTRIM DS) 800-160 MG tablet, Take 1 tablet by mouth 2 times daily for 7 days., Disp: 14 tablet, Rfl: 0     thin (NO BRAND SPECIFIED) lancets, Use to test blood  sugar 3 times daily or as directed. To accompany: Blood Glucose Monitor Brands: per insurance., Disp: 200 each, Rfl: 3     TRULICITY 4.5 MG/0.5ML SOPN, Inject 4.5 mg Subcutaneous once a week, Disp: 2 mL, Rfl: 0    Allergies - No Known Allergies    Social History -   Social History     Socioeconomic History     Marital status:    Tobacco Use     Smoking status: Never     Smokeless tobacco: Never   Vaping Use     Vaping status: Never Used   Substance and Sexual Activity     Alcohol use: Yes     Comment: occasional     Drug use: No     Sexual activity: Yes     Partners: Female       Family History -   Family History   Problem Relation Age of Onset     C.A.D. Father 58        nonfatal MI x 2     Prostate Cancer Father 59     Diabetes Father         type 1     Coronary Artery Disease Father      Heart Disease Father      Myocardial Infarction Brother 50     Prostate Cancer Maternal Grandfather 69     Prostate Cancer Maternal Uncle 48     Colon Cancer No family hx of        Review of Systems - As per HPI and PMHx, otherwise 10+ system review of the head and neck, and general constitution is negative.    Physical Exam  BP (!) 158/91 (BP Location: Right arm, Patient Position: Sitting, Cuff Size: Adult Large)   Pulse 74   Temp 97.1  F (36.2  C) (Tympanic)   Resp 16   SpO2 97%       General - The patient is well nourished and well developed, and appears to have good nutritional status.  Alert and oriented to person and place, answers questions and cooperates with examination appropriately.   Head and Face - Normocephalic and atraumatic, with no gross asymmetry noted of the contour of the facial features other than the lower lip.  The facial nerve is intact, with strong symmetric movements.  Voice and Breathing - The patient was breathing comfortably without the use of accessory muscles. There was no wheezing, stridor, or stertor.  The patients voice was clear and strong, and had appropriate pitch and quality.  Ears  - The tympanic membranes are normal in appearance, bony landmarks are intact.  No retraction, perforation, or masses.  No fluid or purulence was seen in the external canal or the middle ear. No evidence of infection of the middle ear or external canal, cerumen was normal in appearance.  Eyes - Extraocular movements intact, and the pupils were reactive to light.  Sclera were not icteric or injected, conjunctiva were pink and moist.  Mouth - Examination of the oral cavity showed pink, healthy oral mucosa. No lesions or ulcerations noted.  The tongue was mobile and midline, and the dentition were in good condition.  Other than the edematous lower lip, there was no edema in the oral cavity, no distortion of anatomy, normal soft palpation of the floor of mouth  Throat - The walls of the oropharynx were smooth, pink, moist, symmetric, and had no lesions or ulcerations.  The tonsillar pillars and soft palate were symmetric.  The uvula was midline on elevation.          A/P - Karan Josue is a 54 year old male  (L03.211) Facial cellulitis  (primary encounter diagnosis)  (K13.0) Lip abscess    Karan has definitely turned the corner from being drained.    Continue the Bactrim, and I have added three more days of Prednisone.  Also, I have prescribed a 10 day course of Cipro, and have told him to only start that if the swelling and starts to return.  But also contact the ENT team for re evaluation if things get worse.      Again, thank you for allowing me to participate in the care of your patient.        Sincerely,        Lorenzo Torres MD

## 2024-10-11 NOTE — PROGRESS NOTES
History of Present Illness - Karan Josue is a very pleasant 54 year old male here to see me for the first time as an emergency add on due to a lip abscess.    He tells me that he had an ingorwn hair on the edge of his outside of his lip on Monday, and he pulled the hair, and it rapidly worsened.  He was seen in the [facility] in [place], and they placed him on Doxycycline.    To review his records, he was seen just yesterday by Dr Avina and required Needle I and D.  He had been seen previously for this swelling of the lower lip and placed on Doxy, but it did not seem to help.  No airway issues, and able to chew and swallow.    He is currently on Bactrim and a burst of prednisone. He is happy to report things are dramatically better than yesterday since being drained and changing antibiotics        Past Medical History -   Patient Active Problem List   Diagnosis    Family history of prostate cancer    Adjustment disorder with mixed anxiety and depressed mood    Bipolar I disorder (H)    Type 2 diabetes mellitus with hyperglycemia, without long-term current use of insulin (H)    Obesity (BMI 35.0-39.9) with comorbidity (H)    Unstable angina (H)    Benign essential hypertension    Hyperlipidemia LDL goal <100    Squamous cell carcinoma in situ of skin of face       Current Medications -   Current Outpatient Medications:     ACCU-CHEK GUIDE test strip, USE TO TEST THREE TIMES A DAY OR AS DIRECTED, Disp: 100 strip, Rfl: 0    albuterol (PROAIR HFA/PROVENTIL HFA/VENTOLIN HFA) 108 (90 Base) MCG/ACT inhaler, Inhale 2 puffs into the lungs every 6 hours as needed for shortness of breath / dyspnea or wheezing (Patient not taking: Reported on 1/25/2023), Disp: 8.5 g, Rfl: 0    alcohol swab prep pads, Use to swab area of injection/rhett as directed., Disp: 100 each, Rfl: 3    amLODIPine (NORVASC) 5 MG tablet, Take 1 tablet (5 mg) by mouth daily, Disp: 30 tablet, Rfl: 1    aspirin 81 MG tablet, Take 1 tablet (81  mg) by mouth daily, Disp: 90 tablet, Rfl: 3    blood glucose (NO BRAND SPECIFIED) lancets standard, Use to test blood sugar 3 times daily or as directed., Disp: 100 each, Rfl: 11    blood glucose (NO BRAND SPECIFIED) test strip, Use to test blood sugars 3 times daily or as directed, Disp: 100 strip, Rfl: 1    blood glucose monitoring (NO BRAND SPECIFIED) meter device kit, Use to test blood sugar 3 times daily or as directed., Disp: 1 kit, Rfl: 0    canagliflozin (INVOKANA) 100 MG tablet, Take 1 tablet (100 mg) by mouth every morning (before breakfast), Disp: 90 tablet, Rfl: 1    canagliflozin (INVOKANA) 300 MG tablet, Take 1 tablet (300 mg) by mouth every morning (before breakfast)., Disp: 90 tablet, Rfl: 0    Continuous Glucose Monitor KIT, 1 Units 3 times daily (Patient not taking: Reported on 7/17/2023), Disp: 1 kit, Rfl: 0    Continuous Glucose Monitor Sup MISC, 1 Units 3 times daily (Patient not taking: Reported on 4/17/2024), Disp: 90 each, Rfl: 3    insulin pen needle (ULTICARE SHORT) 31G X 8 MM miscellaneous, Use 1 pen needles daily or as directed., Disp: 100 each, Rfl: 3    losartan (COZAAR) 100 MG tablet, Take 1 tablet (100 mg) by mouth daily (Patient not taking: Reported on 5/8/2024), Disp: 90 tablet, Rfl: 1    predniSONE (DELTASONE) 50 MG tablet, Take 1 tablet (50 mg) by mouth daily for 3 days., Disp: 3 tablet, Rfl: 0    rosuvastatin (CRESTOR) 40 MG tablet, Take 1 tablet (40 mg) by mouth daily (Patient not taking: Reported on 5/8/2024), Disp: 90 tablet, Rfl: 3    sulfamethoxazole-trimethoprim (BACTRIM DS) 800-160 MG tablet, Take 1 tablet by mouth 2 times daily for 7 days., Disp: 14 tablet, Rfl: 0    thin (NO BRAND SPECIFIED) lancets, Use to test blood sugar 3 times daily or as directed. To accompany: Blood Glucose Monitor Brands: per insurance., Disp: 200 each, Rfl: 3    TRULICITY 4.5 MG/0.5ML SOPN, Inject 4.5 mg Subcutaneous once a week, Disp: 2 mL, Rfl: 0    Allergies - No Known Allergies    Social  History -   Social History     Socioeconomic History    Marital status:    Tobacco Use    Smoking status: Never    Smokeless tobacco: Never   Vaping Use    Vaping status: Never Used   Substance and Sexual Activity    Alcohol use: Yes     Comment: occasional    Drug use: No    Sexual activity: Yes     Partners: Female       Family History -   Family History   Problem Relation Age of Onset    C.A.D. Father 58        nonfatal MI x 2    Prostate Cancer Father 59    Diabetes Father         type 1    Coronary Artery Disease Father     Heart Disease Father     Myocardial Infarction Brother 50    Prostate Cancer Maternal Grandfather 69    Prostate Cancer Maternal Uncle 48    Colon Cancer No family hx of        Review of Systems - As per HPI and PMHx, otherwise 10+ system review of the head and neck, and general constitution is negative.    Physical Exam  BP (!) 158/91 (BP Location: Right arm, Patient Position: Sitting, Cuff Size: Adult Large)   Pulse 74   Temp 97.1  F (36.2  C) (Tympanic)   Resp 16   SpO2 97%       General - The patient is well nourished and well developed, and appears to have good nutritional status.  Alert and oriented to person and place, answers questions and cooperates with examination appropriately.   Head and Face - Normocephalic and atraumatic, with no gross asymmetry noted of the contour of the facial features other than the lower lip.  The facial nerve is intact, with strong symmetric movements.  Voice and Breathing - The patient was breathing comfortably without the use of accessory muscles. There was no wheezing, stridor, or stertor.  The patients voice was clear and strong, and had appropriate pitch and quality.  Ears - The tympanic membranes are normal in appearance, bony landmarks are intact.  No retraction, perforation, or masses.  No fluid or purulence was seen in the external canal or the middle ear. No evidence of infection of the middle ear or external canal, cerumen was  normal in appearance.  Eyes - Extraocular movements intact, and the pupils were reactive to light.  Sclera were not icteric or injected, conjunctiva were pink and moist.  Mouth - Examination of the oral cavity showed pink, healthy oral mucosa. No lesions or ulcerations noted.  The tongue was mobile and midline, and the dentition were in good condition.  Other than the edematous lower lip, there was no edema in the oral cavity, no distortion of anatomy, normal soft palpation of the floor of mouth  Throat - The walls of the oropharynx were smooth, pink, moist, symmetric, and had no lesions or ulcerations.  The tonsillar pillars and soft palate were symmetric.  The uvula was midline on elevation.          A/P - Karan Josue is a 54 year old male  (L03.211) Facial cellulitis  (primary encounter diagnosis)  (K13.0) Lip abscess    Karan has definitely turned the corner from being drained.    Continue the Bactrim, and I have added three more days of Prednisone.  Also, I have prescribed a 10 day course of Cipro, and have told him to only start that if the swelling and starts to return.  But also contact the ENT team for re evaluation if things get worse.

## 2024-10-11 NOTE — PROGRESS NOTES
[facility]  Community Health Worker Initial Outreach    CHW Initial Information Gathering:  Referral Source: ED Follow-Up  CHW Additional Questions  If ED/Hospital discharge, follow-up appointment scheduled as recommended?: Yes (Pt saw ENT provider this morning)  Emmat active?: Yes    Patient accepts CC: No, patient declined care coordination services at this time. Patient will be sent Care Coordination introduction letter for future reference.       Destinee Sandoval  Community Health Worker  [facility] Care Resource [facility]    *Connected Care Resource Team does NOT follow patient ongoing. Referrals are identified based on internal discharge reports and the outreach is to ensure patient has an understanding of their discharge instructions.

## 2024-10-11 NOTE — LETTER
Karan Josue  [address] [place] [id number]    Dear Karan Josue,      I am a team member within the [facility] with [facility]. I recently contacted you to ensure you are doing well following a visit within our health system. I also wanted to take this chance to introduce Clinic Care Coordination should you have any interest in this program in the future.    Below is a description of Clinic Care Coordination and how this team can further assist you:       The Clinic Care Coordination team is made up of a Registered Nurse, , Financial Resource Worker, and a Community Health Worker who understand and can help navigate the health care system. The goal of clinic care coordination is to help you manage your health, improve access to care, and achieve optimal health outcomes. They work alongside your provider to assist you in determining your health and social needs, obtain health care and community resources, and provide you with necessary information and education. Clinic Care Coordination can work with you through any barriers and develop a care plan that helps coordinate and strengthen the relationship between you and your care team.    If you wish to connect with the Clinic Care Coordination Team, please let your [facility] Primary Care Provider or Clinic Care Team know and they can place a referral. The Clinic Care Coordination team will then reach out by phone to further support you.    We are focused on providing you with the highest-quality healthcare experience possible.    Sincerely,   [facility]

## 2024-10-13 ENCOUNTER — HEALTH MAINTENANCE LETTER (OUTPATIENT)
Age: 54
End: 2024-10-13

## 2024-11-01 DIAGNOSIS — E11.65 TYPE 2 DIABETES MELLITUS WITH HYPERGLYCEMIA, WITHOUT LONG-TERM CURRENT USE OF INSULIN (H): ICD-10-CM

## 2024-11-01 RX ORDER — DULAGLUTIDE 4.5 MG/.5ML
4.5 INJECTION, SOLUTION SUBCUTANEOUS
Qty: 2 ML | Refills: 0 | Status: SHIPPED | OUTPATIENT
Start: 2024-11-01

## 2024-11-25 DIAGNOSIS — E11.65 TYPE 2 DIABETES MELLITUS WITH HYPERGLYCEMIA, WITHOUT LONG-TERM CURRENT USE OF INSULIN (H): ICD-10-CM

## 2024-11-25 RX ORDER — DULAGLUTIDE 4.5 MG/.5ML
4.5 INJECTION, SOLUTION SUBCUTANEOUS
Qty: 2 ML | Refills: 0 | Status: SHIPPED | OUTPATIENT
Start: 2024-11-25

## 2024-12-29 DIAGNOSIS — E11.65 TYPE 2 DIABETES MELLITUS WITH HYPERGLYCEMIA, WITHOUT LONG-TERM CURRENT USE OF INSULIN (H): ICD-10-CM

## 2024-12-30 RX ORDER — DULAGLUTIDE 4.5 MG/.5ML
4.5 INJECTION, SOLUTION SUBCUTANEOUS
Qty: 2 ML | Refills: 1 | Status: SHIPPED | OUTPATIENT
Start: 2024-12-30

## 2025-01-13 ENCOUNTER — PATIENT OUTREACH (OUTPATIENT)
Dept: CARE COORDINATION | Facility: CLINIC | Age: 55
End: 2025-01-13
Payer: COMMERCIAL

## 2025-01-28 ENCOUNTER — TRANSFERRED RECORDS (OUTPATIENT)
Dept: MULTI SPECIALTY CLINIC | Facility: CLINIC | Age: 55
End: 2025-01-28
Payer: COMMERCIAL

## 2025-01-28 LAB — RETINOPATHY: NORMAL

## 2025-01-30 DIAGNOSIS — E11.65 TYPE 2 DIABETES MELLITUS WITH HYPERGLYCEMIA, WITHOUT LONG-TERM CURRENT USE OF INSULIN (H): ICD-10-CM

## 2025-01-30 DIAGNOSIS — I10 BENIGN ESSENTIAL HYPERTENSION: Primary | ICD-10-CM

## 2025-01-30 DIAGNOSIS — Z12.5 SCREENING PSA (PROSTATE SPECIFIC ANTIGEN): ICD-10-CM

## 2025-01-30 DIAGNOSIS — E78.5 HYPERLIPIDEMIA LDL GOAL <100: ICD-10-CM

## 2025-01-30 NOTE — PROGRESS NOTES
Karan Josue has an upcoming lab appointment:    Future Appointments   Date Time Provider Department Center   1/31/2025 11:15 AM [facility]   3/5/2025 10:00 AM Jimena Grider PA-C [facility]     Patient is scheduled for the following lab(s): Lab orders are pending and have not yet been signed.    Please review and place either future orders or HMPO (Review of Health Maintenance Protocol Orders), as appropriate.      Soco Jack

## 2025-03-02 DIAGNOSIS — E11.65 TYPE 2 DIABETES MELLITUS WITH HYPERGLYCEMIA, WITHOUT LONG-TERM CURRENT USE OF INSULIN (H): ICD-10-CM

## 2025-03-04 RX ORDER — DULAGLUTIDE 4.5 MG/.5ML
4.5 INJECTION, SOLUTION SUBCUTANEOUS
Qty: 2 ML | Refills: 1 | Status: SHIPPED | OUTPATIENT
Start: 2025-03-04

## 2025-04-17 ENCOUNTER — PATIENT OUTREACH (OUTPATIENT)
Dept: FAMILY MEDICINE | Facility: CLINIC | Age: 55
End: 2025-04-17
Payer: COMMERCIAL

## 2025-04-17 NOTE — TELEPHONE ENCOUNTER
Patient Quality Outreach    Patient is due for the following:   Diabetes -  A1C, LDL (Fasting), Microalbumin, BP Check, and Diabetic Follow-Up Visit  Hypertension -  BP check  IVD  -  LDL (Fasting) and IVD Follow-up Visit  Physical Preventive Adult Physical    Action(s) Taken:   Patient has upcoming appointment, these items will be addressed at that time.    Type of outreach:    None    Questions for provider review:    None         Magdalena Peter, [facility]  Chart routed to None.

## 2025-05-03 ENCOUNTER — HEALTH MAINTENANCE LETTER (OUTPATIENT)
Age: 55
End: 2025-05-03

## 2025-05-14 ENCOUNTER — OFFICE VISIT (OUTPATIENT)
Dept: FAMILY MEDICINE | Facility: CLINIC | Age: 55
End: 2025-05-14
Payer: COMMERCIAL

## 2025-05-14 VITALS
SYSTOLIC BLOOD PRESSURE: 128 MMHG | BODY MASS INDEX: 28.31 KG/M2 | WEIGHT: 220.6 LBS | OXYGEN SATURATION: 98 % | HEIGHT: 74 IN | DIASTOLIC BLOOD PRESSURE: 78 MMHG | HEART RATE: 78 BPM | TEMPERATURE: 97.4 F | RESPIRATION RATE: 16 BRPM

## 2025-05-14 DIAGNOSIS — E66.01 MORBID OBESITY (H): ICD-10-CM

## 2025-05-14 DIAGNOSIS — I10 BENIGN ESSENTIAL HYPERTENSION: ICD-10-CM

## 2025-05-14 DIAGNOSIS — Z00.00 ROUTINE GENERAL MEDICAL EXAMINATION AT A HEALTH CARE FACILITY: Primary | ICD-10-CM

## 2025-05-14 DIAGNOSIS — E11.65 TYPE 2 DIABETES MELLITUS WITH HYPERGLYCEMIA, WITHOUT LONG-TERM CURRENT USE OF INSULIN (H): ICD-10-CM

## 2025-05-14 DIAGNOSIS — E78.5 HYPERLIPIDEMIA LDL GOAL <100: ICD-10-CM

## 2025-05-14 LAB
ANION GAP SERPL CALCULATED.3IONS-SCNC: 11 MMOL/L (ref 7–15)
BUN SERPL-MCNC: 10.8 MG/DL (ref 6–20)
CALCIUM SERPL-MCNC: 9.8 MG/DL (ref 8.8–10.4)
CHLORIDE SERPL-SCNC: 104 MMOL/L (ref 98–107)
CHOLEST SERPL-MCNC: 218 MG/DL
CREAT SERPL-MCNC: 0.82 MG/DL (ref 0.67–1.17)
CREAT UR-MCNC: 23.8 MG/DL
EGFRCR SERPLBLD CKD-EPI 2021: >90 ML/MIN/1.73M2
EST. AVERAGE GLUCOSE BLD GHB EST-MCNC: 186 MG/DL
FASTING STATUS PATIENT QL REPORTED: YES
FASTING STATUS PATIENT QL REPORTED: YES
GLUCOSE SERPL-MCNC: 171 MG/DL (ref 70–99)
HBA1C MFR BLD: 8.1 % (ref 0–5.6)
HCO3 SERPL-SCNC: 25 MMOL/L (ref 22–29)
HDLC SERPL-MCNC: 47 MG/DL
LDLC SERPL CALC-MCNC: 156 MG/DL
MICROALBUMIN UR-MCNC: <12 MG/L
MICROALBUMIN/CREAT UR: NORMAL MG/G{CREAT}
NONHDLC SERPL-MCNC: 171 MG/DL
POTASSIUM SERPL-SCNC: 4.3 MMOL/L (ref 3.4–5.3)
SODIUM SERPL-SCNC: 140 MMOL/L (ref 135–145)
TRIGL SERPL-MCNC: 73 MG/DL

## 2025-05-14 PROCEDURE — 83036 HEMOGLOBIN GLYCOSYLATED A1C: CPT | Performed by: PHYSICIAN ASSISTANT

## 2025-05-14 PROCEDURE — 80048 BASIC METABOLIC PNL TOTAL CA: CPT | Performed by: PHYSICIAN ASSISTANT

## 2025-05-14 PROCEDURE — 99396 PREV VISIT EST AGE 40-64: CPT | Performed by: PHYSICIAN ASSISTANT

## 2025-05-14 PROCEDURE — 36415 COLL VENOUS BLD VENIPUNCTURE: CPT | Performed by: PHYSICIAN ASSISTANT

## 2025-05-14 PROCEDURE — 82043 UR ALBUMIN QUANTITATIVE: CPT | Performed by: PHYSICIAN ASSISTANT

## 2025-05-14 PROCEDURE — 3078F DIAST BP <80 MM HG: CPT | Performed by: PHYSICIAN ASSISTANT

## 2025-05-14 PROCEDURE — 3074F SYST BP LT 130 MM HG: CPT | Performed by: PHYSICIAN ASSISTANT

## 2025-05-14 PROCEDURE — 82570 ASSAY OF URINE CREATININE: CPT | Performed by: PHYSICIAN ASSISTANT

## 2025-05-14 PROCEDURE — 99213 OFFICE O/P EST LOW 20 MIN: CPT | Mod: 25 | Performed by: PHYSICIAN ASSISTANT

## 2025-05-14 PROCEDURE — 80061 LIPID PANEL: CPT | Performed by: PHYSICIAN ASSISTANT

## 2025-05-14 PROCEDURE — 99207 PR FOOT EXAM NO CHARGE: CPT | Performed by: PHYSICIAN ASSISTANT

## 2025-05-14 RX ORDER — DULAGLUTIDE 4.5 MG/.5ML
4.5 INJECTION, SOLUTION SUBCUTANEOUS
Qty: 6 ML | Refills: 3 | Status: SHIPPED | OUTPATIENT
Start: 2025-05-14

## 2025-05-14 RX ORDER — KETOROLAC TROMETHAMINE 30 MG/ML
1 INJECTION, SOLUTION INTRAMUSCULAR; INTRAVENOUS ONCE
Qty: 1 EACH | Refills: 0 | Status: SHIPPED | OUTPATIENT
Start: 2025-05-14 | End: 2025-05-14

## 2025-05-14 RX ORDER — HYDROCHLOROTHIAZIDE 12.5 MG/1
CAPSULE ORAL
Qty: 6 EACH | Refills: 3 | Status: SHIPPED | OUTPATIENT
Start: 2025-05-14

## 2025-05-14 RX ORDER — LANCETS
EACH MISCELLANEOUS
Qty: 200 EACH | Refills: 3 | Status: SHIPPED | OUTPATIENT
Start: 2025-05-14

## 2025-05-14 RX ORDER — METHYLPREDNISOLONE SODIUM SUCCINATE 125 MG/2ML
INJECTION, POWDER, FOR SOLUTION INTRAMUSCULAR; INTRAVENOUS
Qty: 100 EACH | Refills: 3 | Status: SHIPPED | OUTPATIENT
Start: 2025-05-14

## 2025-05-14 RX ORDER — AMLODIPINE BESYLATE 5 MG/1
5 TABLET ORAL DAILY
Qty: 90 TABLET | Refills: 3 | Status: SHIPPED | OUTPATIENT
Start: 2025-05-14

## 2025-05-14 RX ORDER — LOSARTAN POTASSIUM 100 MG/1
100 TABLET ORAL DAILY
Qty: 90 TABLET | Refills: 3 | Status: SHIPPED | OUTPATIENT
Start: 2025-05-14

## 2025-05-14 SDOH — HEALTH STABILITY: PHYSICAL HEALTH: ON AVERAGE, HOW MANY DAYS PER WEEK DO YOU ENGAGE IN MODERATE TO STRENUOUS EXERCISE (LIKE A BRISK WALK)?: 4 DAYS

## 2025-05-14 SDOH — HEALTH STABILITY: PHYSICAL HEALTH: ON AVERAGE, HOW MANY MINUTES DO YOU ENGAGE IN EXERCISE AT THIS LEVEL?: 30 MIN

## 2025-05-14 ASSESSMENT — SOCIAL DETERMINANTS OF HEALTH (SDOH): HOW OFTEN DO YOU GET TOGETHER WITH FRIENDS OR RELATIVES?: MORE THAN THREE TIMES A WEEK

## 2025-05-14 NOTE — PATIENT INSTRUCTIONS
Patient Education   Preventive Care Advice   This is general advice given by our system to help you stay healthy. However, your care team may have specific advice just for you. Please talk to your care team about your preventive care needs.  Nutrition  Eat 5 or more servings of fruits and vegetables each day.  Try wheat bread, brown rice and whole grain pasta (instead of white bread, rice, and pasta).  Get enough calcium and vitamin D. Check the label on foods and aim for 100% of the RDA (recommended daily allowance).  Lifestyle  Exercise at least 150 minutes each week  (30 minutes a day, 5 days a week).  Do muscle strengthening activities 2 days a week. These help control your weight and prevent disease.  No smoking.  Wear sunscreen to prevent skin cancer.  Have a dental exam and cleaning every 6 months.  Yearly exams  See your health care team every year to talk about:  Any changes in your health.  Any medicines your care team has prescribed.  Preventive care, family planning, and ways to prevent chronic diseases.  Shots (vaccines)   HPV shots (up to age 26), if you've never had them before.  Hepatitis B shots (up to age 59), if you've never had them before.  COVID-19 shot: Get this shot when it's due.  Flu shot: Get a flu shot every year.  Tetanus shot: Get a tetanus shot every 10 years.  Pneumococcal, hepatitis A, and RSV shots: Ask your care team if you need these based on your risk.  Shingles shot (for age 50 and up)  General health tests  Diabetes screening:  Starting at age 35, Get screened for diabetes at least every 3 years.  If you are younger than age 35, ask your care team if you should be screened for diabetes.  Cholesterol test: At age 39, start having a cholesterol test every 5 years, or more often if advised.  Bone density scan (DEXA): At age 50, ask your care team if you should have this scan for osteoporosis (brittle bones).  Hepatitis C: Get tested at least once in your life.  STIs (sexually  transmitted infections)  Before age 24: Ask your care team if you should be screened for STIs.  After age 24: Get screened for STIs if you're at risk. You are at risk for STIs (including HIV) if:  You are sexually active with more than one person.  You don't use condoms every time.  You or a partner was diagnosed with a sexually transmitted infection.  If you are at risk for HIV, ask about PrEP medicine to prevent HIV.  Get tested for HIV at least once in your life, whether you are at risk for HIV or not.  Cancer screening tests  Cervical cancer screening: If you have a cervix, begin getting regular cervical cancer screening tests starting at age 21.  Breast cancer scan (mammogram): If you've ever had breasts, begin having regular mammograms starting at age 40. This is a scan to check for breast cancer.  Colon cancer screening: It is important to start screening for colon cancer at age 45.  Have a colonoscopy test every 10 years (or more often if you're at risk) Or, ask your provider about stool tests like a FIT test every year or Cologuard test every 3 years.  To learn more about your testing options, visit:   .  For help making a decision, visit:   https://bit.ly/tw08233.  Prostate cancer screening test: If you have a prostate, ask your care team if a prostate cancer screening test (PSA) at age 55 is right for you.  Lung cancer screening: If you are a current or former smoker ages 50 to 80, ask your care team if ongoing lung cancer screenings are right for you.  For informational purposes only. Not to replace the advice of your health care provider. Copyright   2023 [employer]. All rights reserved. Clinically reviewed by the [facility] Transitions Program. [employer] [id number] - REV 01/24.

## 2025-05-14 NOTE — PROGRESS NOTES
Preventive Care Visit  [facility] HAYES Grider PA-C, Family Medicine  May 14, 2025      Assessment & Plan     Routine general medical examination at a health care facility  Discussed routine health maintenance and lifestyle recommendations including diet and exercise recommendations.  Appropriate preventive services were discussed with this patient, including applicable screening as appropriate for cardiovascular disease, diabetes, osteopenia/osteoporosis, and glaucoma.  As appropriate for age/gender, discussed screening for colorectal cancer, prostate cancer, breast cancer, and cervical cancer. Discussed applicable vaccinations and recommended labwork.  Checklist reviewing preventive services available has been given to the patient in preventive handout.     He declines vaccinations.    Benign essential hypertension  He has checked blood pressure outside of clinic with normal readings. 2nd reading today came down nicely, he had stressful work call right before first check.  He goes to [facility] clinic - he has B12 infusions and primarily hydration, they check blood pressure and it is always normal per patient.  Continue current medications.    - amLODIPine (NORVASC) 5 MG tablet; Take 1 tablet (5 mg) by mouth daily.  - losartan (COZAAR) 100 MG tablet; Take 1 tablet (100 mg) by mouth daily.    Type 2 diabetes mellitus with hyperglycemia, without long-term current use of insulin (H)  A1C improved! He has not tolerated other medications in the past due to GI upset (Ozempic, metformin, and Jardiance due to GI upse ). He has been on trulicity since 2020 and invokana 2024.  Can also review MTM note from 5/8/24 for more options for the future (Other oral possible options if Invokana not tolerated - low dose Actos and/or glipizide).  He has made significant lifestyle changes and would like to continue this prior to medication change. He is also interested in CGM which I think would be really  helpful for him.    Lab Results   Component Value Date    A1C 8.1 05/14/2025    A1C 9.1 01/31/2025    A1C 10.7 08/28/2024    A1C 10.3 04/17/2024    A1C 8.9 07/17/2023     - Hemoglobin A1c; Future  - Albumin Random Urine Quantitative with Creat Ratio; Future  - FOOT EXAM  - blood glucose (NO BRAND SPECIFIED) lancets standard; Use to test blood sugar 3 times daily or as directed.  - blood glucose (NO BRAND SPECIFIED) test strip; Use to test blood sugars 3 times daily or as directed  - canagliflozin (INVOKANA) 300 MG tablet; Take 1 tablet (300 mg) by mouth every morning (before breakfast).  - insulin pen needle (ULTICARE SHORT) 31G X 8 MM miscellaneous; Use 1 pen needles daily or as directed.  - losartan (COZAAR) 100 MG tablet; Take 1 tablet (100 mg) by mouth daily.  - thin (NO BRAND SPECIFIED) lancets; Use to test blood sugar 3 times daily or as directed. To accompany: Blood Glucose Monitor Brands: per insurance.  - Dulaglutide (TRULICITY) 4.5 MG/0.5ML SOAJ; Inject 4.5 mg subcutaneously every 7 days.  - Hemoglobin A1c  - Albumin Random Urine Quantitative with Creat Ratio  - Continuous Glucose  (FREESTYLE JEEVAN 3 READER) BERTHA; 1 each once for 1 dose. Use to read blood sugars per 's instructions.  - Continuous Glucose Sensor (FREESTYLE JEEVAN 3 PLUS SENSOR) MISC; Use 1 sensor every 15 days. Use to read blood sugars per 's instructions.    Hyperlipidemia LDL goal <100  He has not tolerated statins. Leg aches/cramps. Discussed consider either rosuvastatin 2.5 mg three times a week or low dose pravastatin. He would like to see his results today and consider this. We discussed his risk factors/ASCVD risk. We also discussed lipids specialist referral/alternative medications, he will consider.    Obesity (BMI 35.0-39.9) with comorbidity (H)  He has lost 20 lb with - More exercise, lower portion size  He is pleased with progress and plans to continue.    Wt Readings from Last 4 Encounters:  "  05/14/25 100.1 kg (220 lb 9.6 oz)   10/10/24 109 kg (240 lb 6.4 oz)   04/17/24 111.6 kg (246 lb 1.6 oz)   07/17/23 113.4 kg (250 lb 1.6 oz)        BMI  Estimated body mass index is 28.31 kg/m  as calculated from the following:    Height as of this encounter: 1.88 m (6' 2.02\").    Weight as of this encounter: 100.1 kg (220 lb 9.6 oz).   Weight management plan: Discussed healthy diet and exercise guidelines    Counseling  Appropriate preventive services were addressed with this patient via screening, questionnaire, or discussion as appropriate for fall prevention, nutrition, physical activity, Tobacco-use cessation, social engagement, weight loss and cognition.  Checklist reviewing preventive services available has been given to the patient.  Reviewed patient's diet, addressing concerns and/or questions.     Cecily Russo is a 55 year old, presenting for the following:  Physical        5/14/2025     9:05 AM   Additional Questions   Roomed by Magdalena   Accompanied by Self          HPI  Fasting labs and medication check/refill     Advance Care Planning    Discussed advance care planning with patient; informed AVS has link to [employer] [employer].        5/14/2025   General Health   How would you rate your overall physical health? Good   Feel stress (tense, anxious, or unable to sleep) Not at all         5/14/2025   Nutrition   Three or more servings of calcium each day? Yes   Diet: Diabetic   How many servings of fruit and vegetables per day? (!) 2-3   How many sweetened beverages each day? 0-1         5/14/2025   Exercise   Days per week of moderate/strenous exercise 4 days   Average minutes spent exercising at this level 30 min         5/14/2025   Social Factors   Frequency of gathering with friends or relatives More than three times a week   Worry food won't last until get money to buy more No   Food not last or not have enough money for food? No   Do you have housing? (Housing is defined as stable permanent " housing and does not include staying outside in a car, in a tent, in an abandoned building, in an overnight shelter, or couch-surfing.) Yes   Are you worried about losing your housing? No   Lack of transportation? No   Unable to get utilities (heat,electricity)? No         5/14/2025   Fall Risk   Fallen 2 or more times in the past year? No   Trouble with walking or balance? No          5/14/2025   Dental   Dentist two times every year? Yes     Today's PHQ-2 Score:       5/14/2025     9:05 AM   PHQ-2 ( 1999 [employer])   Q1: Little interest or pleasure in doing things 0   Q2: Feeling down, depressed or hopeless 0   PHQ-2 Score 0    Q1: Little interest or pleasure in doing things Not at all   Q2: Feeling down, depressed or hopeless Not at all   PHQ-2 Score 0       Patient-reported           5/14/2025   Substance Use   Alcohol more than 3/day or more than 7/wk No   Do you use any other substances recreationally? No     Social History     Tobacco Use    Smoking status: Never    Smokeless tobacco: Never   Vaping Use    Vaping status: Never Used   Substance Use Topics    Alcohol use: Yes     Comment: occasional    Drug use: No           5/14/2025   STI Screening   New sexual partner(s) since last STI/HIV test? No   Last PSA:   PSA   Date Value Ref Range Status   09/26/2018 1.92 0 - 4 ug/L Final     Comment:     Assay Method:  Chemiluminescence using Siemens Vista analyzer     Prostate Specific Antigen Screen   Date Value Ref Range Status   01/31/2025 3.42 0.00 - 3.50 ng/mL Final   05/11/2022 2.33 0.00 - 4.00 ug/L Final     ASCVD Risk   The 10-year ASCVD risk score (Anu LESLIE, et al., 2019) is: 15.7%    Values used to calculate the score:      Age: 55 years      Sex: Male      Is Non- : No      Diabetic: Yes      Tobacco smoker: No      Systolic Blood Pressure: 128 mmHg      Is BP treated: Yes      HDL Cholesterol: 45 mg/dL      Total Cholesterol: 200 mg/dL       Reviewed and updated as needed  this visit by Provider                    Past Medical History:   Diagnosis Date    Hypertension     NO ACTIVE PROBLEMS      Past Surgical History:   Procedure Laterality Date    CV CORONARY ANGIOGRAM N/A 10/06/2018    Procedure: Coronary Angiogram;  Surgeon: Efrem Enriquez MD;  Location: [facility] Cath Lab;  Service:     CV LEFT HEART CATHETERIZATION WITH LEFT VENTRICULOGRAM N/A 10/06/2018    Procedure: Left Heart Catheterization with Left Ventriculogram;  Surgeon: Efrem Enriquez MD;  Location: [facility] Cath Lab;  Service:      Lab work is in process  Labs reviewed in EPIC  BP Readings from Last 3 Encounters:   05/14/25 128/78   10/11/24 (!) 158/91   10/10/24 (!) 183/97    Wt Readings from Last 3 Encounters:   05/14/25 100.1 kg (220 lb 9.6 oz)   10/10/24 109 kg (240 lb 6.4 oz)   04/17/24 111.6 kg (246 lb 1.6 oz)                  Patient Active Problem List   Diagnosis    Family history of prostate cancer    Type 2 diabetes mellitus with hyperglycemia, without long-term current use of insulin (H)    Obesity (BMI 35.0-39.9) with comorbidity (H)    Unstable angina (H)    Benign essential hypertension    Hyperlipidemia LDL goal <100    Squamous cell carcinoma in situ of skin of face     Past Surgical History:   Procedure Laterality Date    CV CORONARY ANGIOGRAM N/A 10/06/2018    Procedure: Coronary Angiogram;  Surgeon: Efrem Enriquez MD;  Location: [facility] Cath Lab;  Service:     CV LEFT HEART CATHETERIZATION WITH LEFT VENTRICULOGRAM N/A 10/06/2018    Procedure: Left Heart Catheterization with Left Ventriculogram;  Surgeon: Efrem Enriquez MD;  Location: [facility] Cath Lab;  Service:        Social History     Tobacco Use    Smoking status: Never    Smokeless tobacco: Never   Substance Use Topics    Alcohol use: Yes     Comment: occasional     Family History   Problem Relation Age of Onset    C.A.D. Father 58        nonfatal MI x 2    Prostate Cancer Father 59    Diabetes Father          "type 1    Myocardial Infarction Brother 50    Prostate Cancer Maternal Grandfather 69    Prostate Cancer Maternal Uncle 48    Colon Cancer No family hx of              Review of Systems  CONSTITUTIONAL: NEGATIVE for fever, chills, change in weight  INTEGUMENTARY/SKIN: NEGATIVE for worrisome rashes, moles or lesions  EYES: NEGATIVE for vision changes or irritation  ENT/MOUTH: NEGATIVE for ear, mouth and throat problems  RESP: NEGATIVE for significant cough or SOB  BREAST: NEGATIVE for masses, tenderness or discharge  CV: NEGATIVE for chest pain, palpitations or peripheral edema  GI: NEGATIVE for nausea, abdominal pain, heartburn, or change in bowel habits  : NEGATIVE for frequency, dysuria, or hematuria  MUSCULOSKELETAL: NEGATIVE for significant arthralgias or myalgia  NEURO: NEGATIVE for weakness, dizziness or paresthesias  ENDOCRINE: NEGATIVE for temperature intolerance, skin/hair changes  HEME: NEGATIVE for bleeding problems  PSYCHIATRIC: NEGATIVE for changes in mood or affect     Objective    Exam  /78   Pulse 78   Temp 97.4  F (36.3  C) (Tympanic)   Resp 16   Ht 1.88 m (6' 2.02\")   Wt 100.1 kg (220 lb 9.6 oz)   SpO2 98%   BMI 28.31 kg/m     Estimated body mass index is 28.31 kg/m  as calculated from the following:    Height as of this encounter: 1.88 m (6' 2.02\").    Weight as of this encounter: 100.1 kg (220 lb 9.6 oz).    Physical Exam  GENERAL: alert and no distress  EYES: Eyes grossly normal to inspection, PERRL and conjunctivae and sclerae normal  HENT: ear canals and TM's normal, nose and mouth without ulcers or lesions  NECK: no adenopathy, no asymmetry, masses, or scars  RESP: lungs clear to auscultation - no rales, rhonchi or wheezes  CV: regular rate and rhythm, normal S1 S2, no S3 or S4, no murmur, click or rub, no peripheral edema  ABDOMEN: soft, nontender, no hepatosplenomegaly, no masses and bowel sounds normal  MS: no gross musculoskeletal defects noted, no edema  SKIN: no " suspicious lesions or rashes  NEURO: Normal strength and tone, mentation intact and speech normal  PSYCH: mentation appears normal, affect normal/bright  DIABETIC FOOT EXAM: normal DP and PT pulses, no trophic changes or ulcerative lesions, and normal sensory exam       Signed Electronically by: Jimena Grider PA-C

## 2025-05-16 ENCOUNTER — RESULTS FOLLOW-UP (OUTPATIENT)
Dept: FAMILY MEDICINE | Facility: CLINIC | Age: 55
End: 2025-05-16

## 2025-08-16 ENCOUNTER — HEALTH MAINTENANCE LETTER (OUTPATIENT)
Age: 55
End: 2025-08-16